# Patient Record
Sex: MALE | Race: WHITE | NOT HISPANIC OR LATINO
[De-identification: names, ages, dates, MRNs, and addresses within clinical notes are randomized per-mention and may not be internally consistent; named-entity substitution may affect disease eponyms.]

---

## 2019-08-28 PROBLEM — Z00.00 ENCOUNTER FOR PREVENTIVE HEALTH EXAMINATION: Status: ACTIVE | Noted: 2019-08-28

## 2019-09-25 ENCOUNTER — APPOINTMENT (OUTPATIENT)
Dept: CARDIOLOGY | Facility: CLINIC | Age: 59
End: 2019-09-25

## 2022-03-17 ENCOUNTER — EMERGENCY (EMERGENCY)
Facility: HOSPITAL | Age: 62
LOS: 1 days | Discharge: HOME | End: 2022-03-17
Attending: EMERGENCY MEDICINE
Payer: COMMERCIAL

## 2022-03-17 VITALS
OXYGEN SATURATION: 99 % | WEIGHT: 197.09 LBS | TEMPERATURE: 98 F | RESPIRATION RATE: 18 BRPM | DIASTOLIC BLOOD PRESSURE: 99 MMHG | HEIGHT: 70 IN | SYSTOLIC BLOOD PRESSURE: 163 MMHG | HEART RATE: 81 BPM

## 2022-03-17 VITALS
HEART RATE: 70 BPM | SYSTOLIC BLOOD PRESSURE: 148 MMHG | RESPIRATION RATE: 15 BRPM | TEMPERATURE: 98 F | DIASTOLIC BLOOD PRESSURE: 94 MMHG | OXYGEN SATURATION: 97 %

## 2022-03-17 LAB
ALBUMIN SERPL ELPH-MCNC: 4.9 G/DL — SIGNIFICANT CHANGE UP (ref 3.3–5)
ALP SERPL-CCNC: 88 U/L — SIGNIFICANT CHANGE UP (ref 40–120)
ALT FLD-CCNC: 26 U/L — SIGNIFICANT CHANGE UP (ref 10–45)
ANION GAP SERPL CALC-SCNC: 15 MMOL/L — SIGNIFICANT CHANGE UP (ref 5–17)
APPEARANCE UR: CLEAR — SIGNIFICANT CHANGE UP
AST SERPL-CCNC: 22 U/L — SIGNIFICANT CHANGE UP (ref 10–40)
BASOPHILS # BLD AUTO: 0.04 K/UL — SIGNIFICANT CHANGE UP (ref 0–0.2)
BASOPHILS NFR BLD AUTO: 0.4 % — SIGNIFICANT CHANGE UP (ref 0–2)
BILIRUB SERPL-MCNC: 0.5 MG/DL — SIGNIFICANT CHANGE UP (ref 0.2–1.2)
BILIRUB UR-MCNC: NEGATIVE — SIGNIFICANT CHANGE UP
BUN SERPL-MCNC: 12 MG/DL — SIGNIFICANT CHANGE UP (ref 7–23)
CALCIUM SERPL-MCNC: 9.3 MG/DL — SIGNIFICANT CHANGE UP (ref 8.4–10.5)
CHLORIDE SERPL-SCNC: 102 MMOL/L — SIGNIFICANT CHANGE UP (ref 96–108)
CO2 SERPL-SCNC: 21 MMOL/L — LOW (ref 22–31)
COLOR SPEC: SIGNIFICANT CHANGE UP
CREAT SERPL-MCNC: 1.07 MG/DL — SIGNIFICANT CHANGE UP (ref 0.5–1.3)
DIFF PNL FLD: NEGATIVE — SIGNIFICANT CHANGE UP
EGFR: 78 ML/MIN/1.73M2 — SIGNIFICANT CHANGE UP
EOSINOPHIL # BLD AUTO: 0.1 K/UL — SIGNIFICANT CHANGE UP (ref 0–0.5)
EOSINOPHIL NFR BLD AUTO: 1 % — SIGNIFICANT CHANGE UP (ref 0–6)
GLUCOSE SERPL-MCNC: 105 MG/DL — HIGH (ref 70–99)
GLUCOSE UR QL: NEGATIVE — SIGNIFICANT CHANGE UP
HCT VFR BLD CALC: 43.3 % — SIGNIFICANT CHANGE UP (ref 39–50)
HGB BLD-MCNC: 15.5 G/DL — SIGNIFICANT CHANGE UP (ref 13–17)
IMM GRANULOCYTES NFR BLD AUTO: 0.3 % — SIGNIFICANT CHANGE UP (ref 0–1.5)
KETONES UR-MCNC: NEGATIVE — SIGNIFICANT CHANGE UP
LEUKOCYTE ESTERASE UR-ACNC: NEGATIVE — SIGNIFICANT CHANGE UP
LIDOCAIN IGE QN: 16 U/L — SIGNIFICANT CHANGE UP (ref 7–60)
LYMPHOCYTES # BLD AUTO: 2.31 K/UL — SIGNIFICANT CHANGE UP (ref 1–3.3)
LYMPHOCYTES # BLD AUTO: 23.3 % — SIGNIFICANT CHANGE UP (ref 13–44)
MCHC RBC-ENTMCNC: 31 PG — SIGNIFICANT CHANGE UP (ref 27–34)
MCHC RBC-ENTMCNC: 35.8 GM/DL — SIGNIFICANT CHANGE UP (ref 32–36)
MCV RBC AUTO: 86.6 FL — SIGNIFICANT CHANGE UP (ref 80–100)
MONOCYTES # BLD AUTO: 0.47 K/UL — SIGNIFICANT CHANGE UP (ref 0–0.9)
MONOCYTES NFR BLD AUTO: 4.7 % — SIGNIFICANT CHANGE UP (ref 2–14)
NEUTROPHILS # BLD AUTO: 6.96 K/UL — SIGNIFICANT CHANGE UP (ref 1.8–7.4)
NEUTROPHILS NFR BLD AUTO: 70.3 % — SIGNIFICANT CHANGE UP (ref 43–77)
NITRITE UR-MCNC: NEGATIVE — SIGNIFICANT CHANGE UP
NRBC # BLD: 0 /100 WBCS — SIGNIFICANT CHANGE UP (ref 0–0)
PH UR: 6 — SIGNIFICANT CHANGE UP (ref 5–8)
PLATELET # BLD AUTO: 313 K/UL — SIGNIFICANT CHANGE UP (ref 150–400)
POTASSIUM SERPL-MCNC: 3.8 MMOL/L — SIGNIFICANT CHANGE UP (ref 3.5–5.3)
POTASSIUM SERPL-SCNC: 3.8 MMOL/L — SIGNIFICANT CHANGE UP (ref 3.5–5.3)
PROT SERPL-MCNC: 7.5 G/DL — SIGNIFICANT CHANGE UP (ref 6–8.3)
PROT UR-MCNC: NEGATIVE — SIGNIFICANT CHANGE UP
RBC # BLD: 5 M/UL — SIGNIFICANT CHANGE UP (ref 4.2–5.8)
RBC # FLD: 12 % — SIGNIFICANT CHANGE UP (ref 10.3–14.5)
SODIUM SERPL-SCNC: 138 MMOL/L — SIGNIFICANT CHANGE UP (ref 135–145)
SP GR SPEC: 1.01 — SIGNIFICANT CHANGE UP (ref 1.01–1.02)
UROBILINOGEN FLD QL: NEGATIVE — SIGNIFICANT CHANGE UP
WBC # BLD: 9.91 K/UL — SIGNIFICANT CHANGE UP (ref 3.8–10.5)
WBC # FLD AUTO: 9.91 K/UL — SIGNIFICANT CHANGE UP (ref 3.8–10.5)

## 2022-03-17 PROCEDURE — 85025 COMPLETE CBC W/AUTO DIFF WBC: CPT

## 2022-03-17 PROCEDURE — 74177 CT ABD & PELVIS W/CONTRAST: CPT | Mod: 26,MA

## 2022-03-17 PROCEDURE — 99285 EMERGENCY DEPT VISIT HI MDM: CPT

## 2022-03-17 PROCEDURE — 83690 ASSAY OF LIPASE: CPT

## 2022-03-17 PROCEDURE — 80053 COMPREHEN METABOLIC PANEL: CPT

## 2022-03-17 PROCEDURE — 36415 COLL VENOUS BLD VENIPUNCTURE: CPT

## 2022-03-17 PROCEDURE — 81003 URINALYSIS AUTO W/O SCOPE: CPT

## 2022-03-17 PROCEDURE — 99284 EMERGENCY DEPT VISIT MOD MDM: CPT | Mod: 25

## 2022-03-17 PROCEDURE — 87086 URINE CULTURE/COLONY COUNT: CPT

## 2022-03-17 PROCEDURE — 74177 CT ABD & PELVIS W/CONTRAST: CPT | Mod: MA

## 2022-03-17 NOTE — ED PROVIDER NOTE - OBJECTIVE STATEMENT
61yr M hx of htn, pre diabetes, IBS, anxiety (recently taken off meds) p/w intermittent abd pain rt sided and nausea, no vomiting, and change in stool color. reports dark and light color stools, no melena. no urinary sx, no weight loss. denies fever chills, no cp, sob. is physically active. denies dizziness.   used to take etoh heavily, now only occasionally  non smoker 61yr M hx of htn, pre diabetes, IBS, anxiety (recently taken off meds) p/w intermittent abd pain rt sided and nausea, no vomiting, and change in stool color. reports dark and light color stools, no melena. no urinary sx, no weight loss. denies fever chills, no cp, sob. is physically active. denies dizziness.   used to take etoh heavily, now only occasionally  non smoker    [place], PGY1: 50yo male pt PMH lactose intolerant, gluten intolerant, IBS, anxiety who presents to ED for abdominal pain since 1 week ago worsening since. Patient refers n/v/d, fevers, chills, urinary symptoms. Recently stopped anxiety medication. Follows up with GI outpatient with recent colonoscopy that was found within normal limits except for polyps. Changes to stool color paler yellow recently. Denies abdominal surgeries or any other symptoms at this time.

## 2022-03-17 NOTE — ED PROVIDER NOTE - PHYSICAL EXAMINATION
well appearing, well developed, comfortable no distress  neuro intact  clear lungs  S1-2  no organomegaly, rt sided ttp no rebound  , no hernia  no peripheral edema well appearing, well developed, comfortable no distress  neuro intact  clear lungs  S1-2  no organomegaly, rt sided ttp no rebound  , no hernia  no peripheral edema    [place], PGY1:   GENERAL: Awake, alert, anxious on exam  HEENT: NC/AT, moist mucous membranes   LUNGS: CTAB, no wheezes or crackles   CARDIAC: RRR, no m/r/g  ABDOMEN: Soft, tenderness over RUQ, umbilical and LLQ tp. no rebound or guarding  BACK: no CVA tenderness  EXT: No edema, no calf tenderness, 2+ PT pulses BL  NEURO: A&Ox3. Moving all extremities.  SKIN: Warm and dry. No rash.
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
## 2022-03-17 NOTE — ED PROVIDER NOTE - CLINICAL SUMMARY MEDICAL DECISION MAKING FREE TEXT BOX
61yr M hx of htn, prediabetes IBS anxiety no on any meds (is averse to meds) p/w several days of abd pain. exam as above. concern for possible diverticulutus vs appy, will do CT ap, labs, if work up unremarkable. will need to follow up w GI. happy with plan.

## 2022-03-17 NOTE — ED PROVIDER NOTE - NSICDXPASTMEDICALHX_GEN_ALL_CORE_FT
PAST MEDICAL HISTORY:  Anxiety     Gluten intolerance     IBS (irritable bowel syndrome)     Lactose intolerance

## 2022-03-17 NOTE — ED ADULT NURSE NOTE - OBJECTIVE STATEMENT
62 yo male from home A&OX4 ambulatory on arrival c/o lower quadrant abd pain, more severe in the right, radiating to right flank x 10days. Patient states is exacerbated by eating, denies n/v/d. Patient denies blood in stools, endorses some constipation and irregular BM. Patient denies urinary symptoms, denies fevers/chills. Patient denies chest pain, SOB, palpitations. Abd soft, nondistended, tender RLQ on palpation, states has improved from previous days. VS stable.

## 2022-03-17 NOTE — ED PROVIDER NOTE - NSFOLLOWUPINSTRUCTIONS_ED_ALL_ED_FT
1. Please find your results attached.     2. Follow up with your primary care doctor and gastroenterology for further work up.     3. May take Tylenol or motrin for pain.    4. Return to ED if worsening pain or any other concerning symptoms.       Abdominal Pain, Adult  Abdominal pain can be caused by many things. Often, abdominal pain is not serious and it gets better with no treatment or by being treated at home. However, sometimes abdominal pain is serious. Your health care provider will do a medical history and a physical exam to try to determine the cause of your abdominal pain.    Follow these instructions at home:  Take over-the-counter and prescription medicines only as told by your health care provider. Do not take a laxative unless told by your health care provider.  Drink enough fluid to keep your urine clear or pale yellow.  Watch your condition for any changes.  Keep all follow-up visits as told by your health care provider. This is important.  Contact a health care provider if:  Your abdominal pain changes or gets worse.  You are not hungry or you lose weight without trying.  You are constipated or have diarrhea for more than 2–3 days.  You have pain when you urinate or have a bowel movement.  Your abdominal pain wakes you up at night.  Your pain gets worse with meals, after eating, or with certain foods.  You are throwing up and cannot keep anything down.  You have a fever.  Get help right away if:  Your pain does not go away as soon as your health care provider told you to expect.  You cannot stop throwing up.  Your pain is only in areas of the abdomen, such as the right side or the left lower portion of the abdomen.  You have bloody or black stools, or stools that look like tar.  You have severe pain, cramping, or bloating in your abdomen.  You have signs of dehydration, such as:    Dark urine, very little urine, or no urine.  Cracked lips.  Dry mouth.  Sunken eyes.  Sleepiness.  Weakness.

## 2022-03-17 NOTE — ED PROVIDER NOTE - NS ED ROS FT
CONST: no fevers, no chills  EYES: no pain, no vision changes  ENT: no sore throat, no ear pain, no change in hearing  CV: no chest pain, no leg swelling  RESP: no shortness of breath, no cough  ABD: no nausea, no vomiting, no diarrhea. +abdominal pain  : no dysuria, no flank pain, no hematuria  MSK: no back pain, no extremity pain  NEURO: no headache or additional neurologic complaints  SKIN:  no rash

## 2022-03-17 NOTE — ED PROVIDER NOTE - PATIENT PORTAL LINK FT
You can access the FollowMyHealth Patient Portal offered by [facility] by registering at the following website: [email or web address]. By joining [facility]’s FollowMyHealth portal, you will also be able to view your health information using other applications (apps) compatible with our system.

## 2022-03-17 NOTE — ED PROVIDER NOTE - NS ED ATTENDING STATEMENT MOD
This was a shared visit with the LUKE. I reviewed and verified the documentation and independently performed the documented:

## 2022-03-18 ENCOUNTER — APPOINTMENT (OUTPATIENT)
Dept: CARDIOLOGY | Facility: CLINIC | Age: 62
End: 2022-03-18
Payer: COMMERCIAL

## 2022-03-18 ENCOUNTER — NON-APPOINTMENT (OUTPATIENT)
Age: 62
End: 2022-03-18

## 2022-03-18 VITALS
BODY MASS INDEX: 28 KG/M2 | DIASTOLIC BLOOD PRESSURE: 110 MMHG | SYSTOLIC BLOOD PRESSURE: 186 MMHG | WEIGHT: 200 LBS | HEART RATE: 88 BPM | TEMPERATURE: 98.3 F | HEIGHT: 71 IN | OXYGEN SATURATION: 98 %

## 2022-03-18 DIAGNOSIS — Z13.6 ENCOUNTER FOR SCREENING FOR CARDIOVASCULAR DISORDERS: ICD-10-CM

## 2022-03-18 DIAGNOSIS — Z82.3 FAMILY HISTORY OF STROKE: ICD-10-CM

## 2022-03-18 DIAGNOSIS — Z78.9 OTHER SPECIFIED HEALTH STATUS: ICD-10-CM

## 2022-03-18 DIAGNOSIS — Z86.59 PERSONAL HISTORY OF OTHER MENTAL AND BEHAVIORAL DISORDERS: ICD-10-CM

## 2022-03-18 DIAGNOSIS — Z82.49 FAMILY HISTORY OF ISCHEMIC HEART DISEASE AND OTHER DISEASES OF THE CIRCULATORY SYSTEM: ICD-10-CM

## 2022-03-18 DIAGNOSIS — Z81.8 FAMILY HISTORY OF OTHER MENTAL AND BEHAVIORAL DISORDERS: ICD-10-CM

## 2022-03-18 DIAGNOSIS — Z80.1 FAMILY HISTORY OF MALIGNANT NEOPLASM OF TRACHEA, BRONCHUS AND LUNG: ICD-10-CM

## 2022-03-18 DIAGNOSIS — R03.0 ELEVATED BLOOD-PRESSURE READING, W/OUT DIAGNOSIS OF HYPERTENSION: ICD-10-CM

## 2022-03-18 DIAGNOSIS — R63.4 ABNORMAL WEIGHT LOSS: ICD-10-CM

## 2022-03-18 LAB
CULTURE RESULTS: SIGNIFICANT CHANGE UP
SPECIMEN SOURCE: SIGNIFICANT CHANGE UP

## 2022-03-18 PROCEDURE — 93000 ELECTROCARDIOGRAM COMPLETE: CPT

## 2022-03-18 PROCEDURE — 93306 TTE W/DOPPLER COMPLETE: CPT

## 2022-03-18 PROCEDURE — 99386 PREV VISIT NEW AGE 40-64: CPT

## 2022-03-18 NOTE — REVIEW OF SYSTEMS
[Abdominal Pain] : abdominal pain [Diarrhea] : diarrhea [Constipation] : constipation [Negative] : Heme/Lymph [SOB] : shortness of breath

## 2022-03-18 NOTE — HISTORY OF PRESENT ILLNESS
[FreeTextEntry1] : This is a 61 year old male who presents today to establish care in our office. He reports that he has been experiencing  diffuse abdominal pain radiating to lower back for approx 2 weeks associated with abdominal bloating, diarrhea alternating with constipation. He went to urgent care on monday and went to [facility] ER yesterday. He had CT abdomen and pelvis which was negative for acute pathology. Findings were consistent with small hiatal hernia and hepatic steatosis. He reports he still is experiencing mild discomfort and loss of appetite.  He reports that over the last two weeks his BP has been running in the 180's/100's sytolic. He reports he has been checking his BP at home and usually is around 140's/70. He denies chest pain, sob, dizziness,palpitations. He has hx of MI in his father at age 66. He reports he is increased in stress/anxiety at home. He sees psychiatry and has been weaning himself off mirtazapine. pt with ocassional dyspnea

## 2022-03-21 ENCOUNTER — NON-APPOINTMENT (OUTPATIENT)
Age: 62
End: 2022-03-21

## 2022-03-21 ENCOUNTER — APPOINTMENT (OUTPATIENT)
Dept: GASTROENTEROLOGY | Facility: CLINIC | Age: 62
End: 2022-03-21
Payer: COMMERCIAL

## 2022-03-21 VITALS
WEIGHT: 200 LBS | DIASTOLIC BLOOD PRESSURE: 110 MMHG | HEIGHT: 70 IN | SYSTOLIC BLOOD PRESSURE: 160 MMHG | BODY MASS INDEX: 28.63 KG/M2

## 2022-03-21 DIAGNOSIS — R19.8 OTHER SPECIFIED SYMPTOMS AND SIGNS INVOLVING THE DIGESTIVE SYSTEM AND ABDOMEN: ICD-10-CM

## 2022-03-21 DIAGNOSIS — Z63.5 DISRUPTION OF FAMILY BY SEPARATION AND DIVORCE: ICD-10-CM

## 2022-03-21 DIAGNOSIS — Z87.19 PERSONAL HISTORY OF OTHER DISEASES OF THE DIGESTIVE SYSTEM: ICD-10-CM

## 2022-03-21 DIAGNOSIS — K76.0 FATTY (CHANGE OF) LIVER, NOT ELSEWHERE CLASSIFIED: ICD-10-CM

## 2022-03-21 PROCEDURE — 99204 OFFICE O/P NEW MOD 45 MIN: CPT

## 2022-03-21 SDOH — SOCIAL STABILITY - SOCIAL INSECURITY: DISRUPTION OF FAMILY BY SEPARATION AND DIVORCE: Z63.5

## 2022-03-21 NOTE — HISTORY OF PRESENT ILLNESS
[FreeTextEntry1] : He is a 61-year-old male who has been on anti anxiety medication for years, but was taken off of it one month ago.  Two weeks ago he had increased stress in his life and developed intermittent crampy diffuse abdominal pain associated with irregular bowel habits.  He denies rectal bleeding, fever, chills or weight loss.  He was seen in the ER in which a CAT scan revealed diverticular disease and a fatty liver,  but no diverticulitis.  He had a  colonoscopy 2 years ago in  which a few polyps were removed and diverticular disease was seen.  He also had an endoscopy 2 years ago which  was normal.  He went back on his antianxiety medications  4 days ago, and is feeling better, but is an asymptomatic

## 2022-03-21 NOTE — REVIEW OF SYSTEMS
[Abdominal Pain] : abdominal pain [Constipation] : constipation [Diarrhea] : diarrhea [Negative] : Heme/Lymph

## 2022-03-21 NOTE — PHYSICAL EXAM
[General Appearance - Alert] : alert [General Appearance - In No Acute Distress] : in no acute distress [Sclera] : the sclera and conjunctiva were normal [PERRL With Normal Accommodation] : pupils were equal in size, round, and reactive to light [Outer Ear] : the ears and nose were normal in appearance [Extraocular Movements] : extraocular movements were intact [Oropharynx] : the oropharynx was normal [Neck Appearance] : the appearance of the neck was normal [Neck Cervical Mass (___cm)] : no neck mass was observed [Jugular Venous Distention Increased] : there was no jugular-venous distention [Thyroid Diffuse Enlargement] : the thyroid was not enlarged [Thyroid Nodule] : there were no palpable thyroid nodules [Heart Rate And Rhythm] : heart rate was normal and rhythm regular [Heart Sounds] : normal S1 and S2 [Heart Sounds Gallop] : no gallops [Murmurs] : no murmurs [Heart Sounds Pericardial Friction Rub] : no pericardial rub [Bowel Sounds] : normal bowel sounds [Abdomen Soft] : soft [Abdomen Tenderness] : non-tender [] : no hepato-splenomegaly [Abdomen Mass (___ Cm)] : no abdominal mass palpated

## 2022-03-21 NOTE — CONSULT LETTER
[Dear  ___] : Dear  [unfilled], [Consult Letter:] : I had the pleasure of evaluating your patient, [unfilled]. [( Thank you for referring [unfilled] for consultation for _____ )] : Thank you for referring [unfilled] for consultation for [unfilled] [Please see my note below.] : Please see my note below. [Consult Closing:] : Thank you very much for allowing me to participate in the care of this patient.  If you have any questions, please do not hesitate to contact me. [Sincerely,] : Sincerely, [FreeTextEntry3] : Don\par \par Enrike Ken MD\par \par Gastroenterology\par [facility]\par [facility]\par \par

## 2022-03-21 NOTE — ASSESSMENT
[FreeTextEntry1] : He has an exacerbation of his irritable bowel syndrome,  which is improving somewhat since he is back on his medications.\par I also recommended that he start hyoscyamine 0.375 mg twice a day\par \par Follow-up in the office in 2 to 3 weeks\par \par I referred him back to you for blood pressure control and has an appointment to see you tomorrow

## 2022-03-22 ENCOUNTER — TRANSCRIPTION ENCOUNTER (OUTPATIENT)
Age: 62
End: 2022-03-22

## 2022-03-22 ENCOUNTER — APPOINTMENT (OUTPATIENT)
Dept: CARDIOLOGY | Facility: CLINIC | Age: 62
End: 2022-03-22
Payer: COMMERCIAL

## 2022-03-22 DIAGNOSIS — I47.1 SUPRAVENTRICULAR TACHYCARDIA: ICD-10-CM

## 2022-03-22 PROCEDURE — 78452 HT MUSCLE IMAGE SPECT MULT: CPT

## 2022-03-22 PROCEDURE — 93015 CV STRESS TEST SUPVJ I&R: CPT

## 2022-03-22 PROCEDURE — A9500: CPT

## 2022-03-23 ENCOUNTER — NON-APPOINTMENT (OUTPATIENT)
Age: 62
End: 2022-03-23

## 2022-03-24 LAB
25(OH)D3 SERPL-MCNC: 44.3 NG/ML
ALBUMIN SERPL ELPH-MCNC: 5.2 G/DL
ALDOSTERONE SERUM: 8.9 NG/DL
ALP BLD-CCNC: 97 U/L
ALT SERPL-CCNC: 29 U/L
AMYLASE/CREAT SERPL: 36 U/L
ANION GAP SERPL CALC-SCNC: 18 MMOL/L
AST SERPL-CCNC: 19 U/L
BASOPHILS # BLD AUTO: 0.02 K/UL
BASOPHILS NFR BLD AUTO: 0.2 %
BILIRUB DIRECT SERPL-MCNC: 0.1 MG/DL
BILIRUB INDIRECT SERPL-MCNC: 0.3 MG/DL
BILIRUB SERPL-MCNC: 0.4 MG/DL
BUN SERPL-MCNC: 15 MG/DL
CALCIUM SERPL-MCNC: 9.9 MG/DL
CHLORIDE SERPL-SCNC: 101 MMOL/L
CHOLEST SERPL-MCNC: 221 MG/DL
CO2 SERPL-SCNC: 20 MMOL/L
CREAT SERPL-MCNC: 1.19 MG/DL
EGFR: 69 ML/MIN/1.73M2
EOSINOPHIL # BLD AUTO: 0.07 K/UL
EOSINOPHIL NFR BLD AUTO: 0.7 %
ESTIMATED AVERAGE GLUCOSE: 114 MG/DL
GLUCOSE SERPL-MCNC: 97 MG/DL
HBA1C MFR BLD HPLC: 5.6 %
HCT VFR BLD CALC: 48.5 %
HDLC SERPL-MCNC: 72 MG/DL
HGB BLD-MCNC: 16.7 G/DL
IMM GRANULOCYTES NFR BLD AUTO: 0.3 %
LDLC SERPL CALC-MCNC: 127 MG/DL
LPL SERPL-CCNC: 19 U/L
LYMPHOCYTES # BLD AUTO: 1.95 K/UL
LYMPHOCYTES NFR BLD AUTO: 19.2 %
MAGNESIUM SERPL-MCNC: 2.2 MG/DL
MAN DIFF?: NORMAL
MCHC RBC-ENTMCNC: 31.4 PG
MCHC RBC-ENTMCNC: 34.4 GM/DL
MCV RBC AUTO: 91.2 FL
MONOCYTES # BLD AUTO: 0.48 K/UL
MONOCYTES NFR BLD AUTO: 4.7 %
NEUTROPHILS # BLD AUTO: 7.61 K/UL
NEUTROPHILS NFR BLD AUTO: 74.9 %
NONHDLC SERPL-MCNC: 149 MG/DL
PHOSPHATE SERPL-MCNC: 4.1 MG/DL
PLATELET # BLD AUTO: 327 K/UL
POTASSIUM SERPL-SCNC: 4.2 MMOL/L
PROT SERPL-MCNC: 7.5 G/DL
PSA SERPL-MCNC: 0.62 NG/ML
RBC # BLD: 5.32 M/UL
RBC # FLD: 12.6 %
SODIUM SERPL-SCNC: 140 MMOL/L
T3FREE SERPL-MCNC: 3 PG/ML
T4 FREE SERPL-MCNC: 1.3 NG/DL
TRIGL SERPL-MCNC: 110 MG/DL
TSH SERPL-ACNC: 1.06 UIU/ML
URATE SERPL-MCNC: 6.7 MG/DL
WBC # FLD AUTO: 10.16 K/UL

## 2022-03-25 ENCOUNTER — APPOINTMENT (OUTPATIENT)
Dept: CT IMAGING | Facility: CLINIC | Age: 62
End: 2022-03-25

## 2022-03-25 ENCOUNTER — OUTPATIENT (OUTPATIENT)
Dept: OUTPATIENT SERVICES | Facility: HOSPITAL | Age: 62
LOS: 1 days | End: 2022-03-25

## 2022-03-25 ENCOUNTER — RESULT REVIEW (OUTPATIENT)
Age: 62
End: 2022-03-25

## 2022-03-25 ENCOUNTER — APPOINTMENT (OUTPATIENT)
Dept: CT IMAGING | Facility: CLINIC | Age: 62
End: 2022-03-25
Payer: COMMERCIAL

## 2022-03-25 ENCOUNTER — APPOINTMENT (OUTPATIENT)
Dept: ULTRASOUND IMAGING | Facility: CLINIC | Age: 62
End: 2022-03-25
Payer: COMMERCIAL

## 2022-03-25 PROCEDURE — 71250 CT THORAX DX C-: CPT | Mod: 26

## 2022-03-25 PROCEDURE — 75571 CT HRT W/O DYE W/CA TEST: CPT | Mod: 26,59

## 2022-03-25 PROCEDURE — 93976 VASCULAR STUDY: CPT | Mod: 26

## 2022-03-25 PROCEDURE — 76770 US EXAM ABDO BACK WALL COMP: CPT | Mod: 26,59

## 2022-03-31 ENCOUNTER — APPOINTMENT (OUTPATIENT)
Dept: CARDIOLOGY | Facility: CLINIC | Age: 62
End: 2022-03-31
Payer: COMMERCIAL

## 2022-03-31 VITALS
DIASTOLIC BLOOD PRESSURE: 90 MMHG | HEIGHT: 70 IN | HEART RATE: 70 BPM | WEIGHT: 200 LBS | SYSTOLIC BLOOD PRESSURE: 170 MMHG | BODY MASS INDEX: 28.63 KG/M2 | OXYGEN SATURATION: 99 % | TEMPERATURE: 98.6 F

## 2022-03-31 DIAGNOSIS — R91.8 OTHER NONSPECIFIC ABNORMAL FINDING OF LUNG FIELD: ICD-10-CM

## 2022-03-31 PROCEDURE — 99214 OFFICE O/P EST MOD 30 MIN: CPT

## 2022-03-31 RX ORDER — ASPIRIN ENTERIC COATED TABLETS 81 MG 81 MG/1
81 TABLET, DELAYED RELEASE ORAL DAILY
Qty: 30 | Refills: 3 | Status: ACTIVE | COMMUNITY
Start: 2022-03-31 | End: 1900-01-01

## 2022-03-31 NOTE — HISTORY OF PRESENT ILLNESS
[FreeTextEntry1] : This is a 62 year old male who presents today for follow up visit. He reports he did follow with GI and ws started on hyoscyamine 0.375 BID and patient reports his abdominal pain has improved. Pt did start BP medications as advised, pt BP elevated in office today, pt feels this is due to "white coat syndrome". He is s/p ECHO and normal nuclear stress test as well as normal US kidneys.  Pt requesting referral  to allergist. Denies chest pain, dyspnea, dizziness, palpations, changes in appetite/bowel habits, nausea, vomiting, abdominal pain.\par

## 2022-04-07 ENCOUNTER — APPOINTMENT (OUTPATIENT)
Dept: CARDIOLOGY | Facility: CLINIC | Age: 62
End: 2022-04-07

## 2022-04-07 ENCOUNTER — APPOINTMENT (OUTPATIENT)
Dept: GASTROENTEROLOGY | Facility: CLINIC | Age: 62
End: 2022-04-07
Payer: COMMERCIAL

## 2022-04-07 ENCOUNTER — RESULT REVIEW (OUTPATIENT)
Age: 62
End: 2022-04-07

## 2022-04-07 ENCOUNTER — OUTPATIENT (OUTPATIENT)
Dept: OUTPATIENT SERVICES | Facility: HOSPITAL | Age: 62
LOS: 1 days | End: 2022-04-07
Payer: COMMERCIAL

## 2022-04-07 VITALS
SYSTOLIC BLOOD PRESSURE: 140 MMHG | BODY MASS INDEX: 29.06 KG/M2 | DIASTOLIC BLOOD PRESSURE: 90 MMHG | HEIGHT: 70 IN | WEIGHT: 203 LBS

## 2022-04-07 DIAGNOSIS — I25.10 ATHEROSCLEROTIC HEART DISEASE OF NATIVE CORONARY ARTERY WITHOUT ANGINA PECTORIS: ICD-10-CM

## 2022-04-07 DIAGNOSIS — R06.02 SHORTNESS OF BREATH: ICD-10-CM

## 2022-04-07 PROCEDURE — 75574 CT ANGIO HRT W/3D IMAGE: CPT

## 2022-04-07 PROCEDURE — 99214 OFFICE O/P EST MOD 30 MIN: CPT

## 2022-04-07 PROCEDURE — 75574 CT ANGIO HRT W/3D IMAGE: CPT | Mod: 26

## 2022-04-07 NOTE — CONSULT LETTER
[Dear  ___] : Dear  [unfilled], [Consult Letter:] : I had the pleasure of evaluating your patient, [unfilled]. [( Thank you for referring [unfilled] for consultation for _____ )] : Thank you for referring [unfilled] for consultation for [unfilled] [Please see my note below.] : Please see my note below. [Consult Closing:] : Thank you very much for allowing me to participate in the care of this patient.  If you have any questions, please do not hesitate to contact me. [Sincerely,] : Sincerely, [FreeTextEntry3] : Don\par \par Enrike Ken MD\par \par Gastroenterology\par [facility]\par [facility]\par \par

## 2022-04-07 NOTE — HISTORY OF PRESENT ILLNESS
[FreeTextEntry1] : He is a 62 year old male with crampy abdominal pain and irregular bowel habits. He was started on  hyoscyamine 0.375 mg twice  a day with complete resolution of his symptoms.  He denies abdominal pain and is presently having good bowel movements.  He is  undergoing cardiac evaluation as he has an abnormal calcium score on his cardiac CT scan.

## 2022-04-07 NOTE — PHYSICAL EXAM
[Sclera] : the sclera and conjunctiva were normal [PERRL With Normal Accommodation] : pupils were equal in size, round, and reactive to light [Extraocular Movements] : extraocular movements were intact [Outer Ear] : the ears and nose were normal in appearance [Oropharynx] : the oropharynx was normal [Neck Appearance] : the appearance of the neck was normal [Neck Cervical Mass (___cm)] : no neck mass was observed [Jugular Venous Distention Increased] : there was no jugular-venous distention [Thyroid Diffuse Enlargement] : the thyroid was not enlarged [Thyroid Nodule] : there were no palpable thyroid nodules [Bowel Sounds] : normal bowel sounds [Abdomen Soft] : soft [Abdomen Tenderness] : non-tender [] : no hepato-splenomegaly [Abdomen Mass (___ Cm)] : no abdominal mass palpated

## 2022-04-07 NOTE — ASSESSMENT
[FreeTextEntry1] : Continue hyoscyamine\par \par Office follow-up in 3 to 4 months and, if he is feeling well at that time, will start to  taper his medication

## 2022-04-11 PROCEDURE — 93224 XTRNL ECG REC UP TO 48 HRS: CPT

## 2022-04-12 ENCOUNTER — NON-APPOINTMENT (OUTPATIENT)
Age: 62
End: 2022-04-12

## 2022-04-13 ENCOUNTER — APPOINTMENT (OUTPATIENT)
Dept: ALLERGY | Facility: CLINIC | Age: 62
End: 2022-04-13
Payer: COMMERCIAL

## 2022-04-13 VITALS
DIASTOLIC BLOOD PRESSURE: 87 MMHG | OXYGEN SATURATION: 97 % | HEART RATE: 83 BPM | TEMPERATURE: 99.2 F | SYSTOLIC BLOOD PRESSURE: 162 MMHG

## 2022-04-13 LAB
ANION GAP SERPL CALC-SCNC: 16 MMOL/L
BUN SERPL-MCNC: 15 MG/DL
CALCIUM SERPL-MCNC: 9.7 MG/DL
CHLORIDE SERPL-SCNC: 103 MMOL/L
CO2 SERPL-SCNC: 21 MMOL/L
CREAT SERPL-MCNC: 1.06 MG/DL
EGFR: 78 ML/MIN/1.73M2
GLUCOSE SERPL-MCNC: 96 MG/DL
POTASSIUM SERPL-SCNC: 4.5 MMOL/L
SODIUM SERPL-SCNC: 140 MMOL/L

## 2022-04-13 PROCEDURE — 99204 OFFICE O/P NEW MOD 45 MIN: CPT | Mod: 25

## 2022-04-13 PROCEDURE — 95018 ALL TSTG PERQ&IQ DRUGS/BIOL: CPT

## 2022-04-13 PROCEDURE — 95004 PERQ TESTS W/ALRGNC XTRCS: CPT

## 2022-04-13 NOTE — SOCIAL HISTORY
[Apartment] : [unfilled] lives in an apartment  [] :  [de-identified] : son  [FreeTextEntry2] : Retired   [Smokers in Household] : there are no smokers in the home

## 2022-04-13 NOTE — HISTORY OF PRESENT ILLNESS
[Asthma] : asthma [Allergic Rhinitis] : allergic rhinitis [Eczematous rashes] : eczematous rashes [Venom Reactions] : venom reactions [Food Allergies] : food allergies [de-identified] : Patient reports allergic reaction to the following drugs:\par \par Penicillin - age 2 - he has no history to recall \par Sulfa - age 2 - he has no history to recall \par \par One month ago - under a lot of stress - drinking a lot of tea - history of IBD - elevated blood pressure - irritable stomach - itchy - tingling of his lips - he was taken off of mirtazipine - PCP restarted medication and he spoke with psychiatrist and was advised to restart medication. His cardiac evaluation was normal.   He expressed concern about chamomile tea resulting in his symptoms.

## 2022-04-13 NOTE — ASSESSMENT
[FreeTextEntry1] : Remote history of penicillin allergy - he will RV for antibiotic skin testing \par \par Anxiety disorder with no evidence of underlying food allergies

## 2022-04-13 NOTE — PHYSICAL EXAM
[Alert] : alert [Well Nourished] : well nourished [Healthy Appearance] : healthy appearance [No Acute Distress] : no acute distress [Well Developed] : well developed [Normal Voice/Communication] : normal voice communication [No Neck Mass] : no neck mass was observed [No LAD] : no lymphadenopathy [No Thyroid Mass] : no thyroid mass [Supple] : the neck was supple [Normal Rate and Effort] : normal respiratory rhythm and effort [No Crackles] : no crackles [No Retractions] : no retractions [Wheezing] : no wheezing was heard [Normal Rate] : heart rate was normal  [Normal S1, S2] : normal S1 and S2 [No murmur] : no murmur [Regular Rhythm] : with a regular rhythm [Normal Cervical Lymph Nodes] : cervical [Skin Intact] : skin intact  [No Rash] : no rash [No clubbing] : no clubbing [No Cyanosis] : no cyanosis [de-identified] : anxious male

## 2022-04-27 ENCOUNTER — APPOINTMENT (OUTPATIENT)
Dept: CARDIOLOGY | Facility: CLINIC | Age: 62
End: 2022-04-27
Payer: COMMERCIAL

## 2022-04-27 VITALS
DIASTOLIC BLOOD PRESSURE: 89 MMHG | SYSTOLIC BLOOD PRESSURE: 143 MMHG | OXYGEN SATURATION: 98 % | HEART RATE: 67 BPM | BODY MASS INDEX: 29.2 KG/M2 | TEMPERATURE: 98.3 F | HEIGHT: 70 IN | WEIGHT: 204 LBS

## 2022-04-27 DIAGNOSIS — T78.40XA ALLERGY, UNSPECIFIED, INITIAL ENCOUNTER: ICD-10-CM

## 2022-04-27 DIAGNOSIS — R29.898 OTHER SYMPTOMS AND SIGNS INVOLVING THE MUSCULOSKELETAL SYSTEM: ICD-10-CM

## 2022-04-27 PROCEDURE — 99213 OFFICE O/P EST LOW 20 MIN: CPT

## 2022-04-27 RX ORDER — MIRTAZAPINE 7.5 MG/1
7.5 TABLET, FILM COATED ORAL
Qty: 30 | Refills: 3 | Status: ACTIVE | COMMUNITY

## 2022-04-27 NOTE — HISTORY OF PRESENT ILLNESS
[FreeTextEntry1] : This is a 62 year old male with a Pmhx of HTN HLD CAD, IBS who presents to the office for follow up\par \par seen 3/13/22 and was started on ASA and Atorvastatin 40\par pt reports tolerating medication well denies any complaints\par Denies any dizziness CP or SOB denies any myalgias \par \par pt reports he finds his BP increased during stressful situations - last week had a wedding and was stressed SBP 140s-150s \par Denies any headaches or blurry vision \par \par

## 2022-05-02 ENCOUNTER — APPOINTMENT (OUTPATIENT)
Dept: ALLERGY | Facility: CLINIC | Age: 62
End: 2022-05-02
Payer: COMMERCIAL

## 2022-05-02 PROCEDURE — 95004 PERQ TESTS W/ALRGNC XTRCS: CPT

## 2022-05-02 PROCEDURE — 95024 IQ TESTS W/ALLERGENIC XTRCS: CPT

## 2022-05-02 PROCEDURE — 95018 ALL TSTG PERQ&IQ DRUGS/BIOL: CPT

## 2022-05-02 NOTE — IMPRESSION
[FreeTextEntry1] : Skin testing to Penicillin and related antibiotics negative - cat/dog skin testing negative

## 2022-05-02 NOTE — ASSESSMENT
[FreeTextEntry1] : Antibiotic skin testing to Penicillin and related antibiotics were negative.   Cat/dog skin testing negative.

## 2022-05-04 ENCOUNTER — APPOINTMENT (OUTPATIENT)
Dept: ORTHOPEDIC SURGERY | Facility: CLINIC | Age: 62
End: 2022-05-04
Payer: COMMERCIAL

## 2022-05-04 ENCOUNTER — NON-APPOINTMENT (OUTPATIENT)
Age: 62
End: 2022-05-04

## 2022-05-04 VITALS
BODY MASS INDEX: 28.31 KG/M2 | SYSTOLIC BLOOD PRESSURE: 182 MMHG | HEIGHT: 70.5 IN | WEIGHT: 200 LBS | DIASTOLIC BLOOD PRESSURE: 73 MMHG | HEART RATE: 85 BPM

## 2022-05-04 PROCEDURE — 99204 OFFICE O/P NEW MOD 45 MIN: CPT | Mod: 25

## 2022-05-04 PROCEDURE — 73140 X-RAY EXAM OF FINGER(S): CPT | Mod: F5

## 2022-05-04 NOTE — DISCUSSION/SUMMARY
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 At this time, we discussed treatment options consisting of an right thumb IP joint fusion. I did tell him that even with the fusion, he will still have further shortening to the right thumb. He would like to proceed with fusion of the right thumb IP joint and will speak with our surgical scheduler. \par \par -  The nature and purposes of the operation/procedure was discussed in detail.  I discussed the surgical procedure in detail, as well as expected postoperative recovery and outcome.\par -  Possible risks, benefits, and complications (from known and unknown causes) of the procedure were discussed in detail.  \par -  Possible non-operative alternatives to the proposed treatment were discussed in detail.  \par -  He was told that possible risks/complications include, but are not limited to:  Infection, sensory nerve or vessel injury, stiffness, painful scar, poor outcome, need for additional surgical procedures, and other unforeseen complications.  \par -  In addition, the possibility of an "unsuccessful outcome," despite "successful surgery," was discussed with him.  Specifically, we discussed the chances of nonunion and hardware related complications including the possible need for removal of the hardware.  Finally, I discussed with him that he already has shortening of the thumb and with the bone resection, this will require further shortening.  He understands that his thumb will be shorter but this should stabilize the joint.\par -  The patient fully understands these risks and wishes to proceed.  \par -  I had a lengthy discussion with the patient regarding today's visit, the diagnosis, and my surgical treatment recommendations.  The patient has agreed to this plan of management and has expressed full understanding.  All questions were fully answered to the patient's satisfaction. 	 \par \par My cumulative time spent on this visit included: Preparation for the visit, review of the medical records, review of pertinent diagnostic studies, examination and counseling of the patient on the above diagnosis, treatment plan and prognosis, orders of diagnostic tests, medication and/or appropriate procedures and documentation in the medical records of today's visit.

## 2022-05-04 NOTE — END OF VISIT
[FreeTextEntry3] : This note was written by Vee Ryees on 05/04/2022 acting solely as a scribe for Dr. Vern Napier.\par  \par All medical record entries made by the Scribe were at my, Dr. Vern Napier, direction and personally dictated by me on 05/04/2022. I have personally reviewed the chart and agree that the record accurately reflects my personal performance of the history, physical exam, assessment and plan.

## 2022-05-04 NOTE — CONSULT LETTER
[Dear  ___] : Dear  [unfilled], [Consult Letter:] : I had the pleasure of evaluating your patient, [unfilled]. [Please see my note below.] : Please see my note below. [Consult Closing:] : Thank you very much for allowing me to participate in the care of this patient.  If you have any questions, please do not hesitate to contact me. [Sincerely,] : Sincerely, [FreeTextEntry3] : Vern Napier M.D.\par Surgery of the Hand and Upper Extremity\par Orthopaedic Surgery\par Chief, Hand Service, [facility]\par Director, Hand Service, [facility]\par  Of Orthopaedic Surgery, [facility] [employer] [facility] [employer] [facility]\par [facility] Email: [email or web address] Office Phone: [phone]

## 2022-05-04 NOTE — PHYSICAL EXAM
[de-identified] : - Constitutional: This is a male in no obvious distress.  \par - Psych: Patient is alert and oriented to person, place and time.  Patient has a normal mood and affect.\par - Cardiovascular: Normal pulses throughout the upper extremities.  No significant varicosities are noted in the upper extremities. \par - Neuro: Patient has normal coordination.\par - Respiratory:  Patient exhibits no evidence of shortness of breath or difficulty breathing.\par - Skin: No rashes, lesions, or other abnormalities are noted in the upper extremities.\par \par --- \par \par Examination of his right thumb demonstrates swelling at the IP joint.  He has a healed skin graft along the dorsal ulnar aspect of the IP joint.  The thumb is shortened.  He has full extension with approximately 20 degrees of IP flexion.  He has gross instability to stress testing of the IP joint ulnar collateral ligament with approximately 75 degrees of instability.  He is neurovascular intact distally at the thumb. 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

## 2022-05-04 NOTE — ADDENDUM
[FreeTextEntry1] : I, Vee Reyes, acted solely as a scribe for Dr. Napier on this date on 05/04/2022.

## 2022-05-04 NOTE — HISTORY OF PRESENT ILLNESS
[Right] : right hand dominant [FreeTextEntry1] : He comes in today for evaluation of a right thumb injury which initially occurred 40 years ago. He states he broke the thumb while playing softball but that the injury was initially misdiagnosed and he was treated with physical therapy. Eventually, a silicon  was placed at the IP joint but he states it has since degraded. 7 years ago, he reinjured the thumb while using a power tool, he severed sensory nerves and required a skin graft. He presents today with instability of the thumb and there is a notable deformity, the thumb is shortened. He is not taking medication for pain at this time.

## 2022-05-06 ENCOUNTER — NON-APPOINTMENT (OUTPATIENT)
Age: 62
End: 2022-05-06

## 2022-05-12 ENCOUNTER — OUTPATIENT (OUTPATIENT)
Dept: OUTPATIENT SERVICES | Facility: HOSPITAL | Age: 62
LOS: 1 days | End: 2022-05-12
Payer: COMMERCIAL

## 2022-05-12 VITALS
HEIGHT: 70 IN | HEART RATE: 70 BPM | TEMPERATURE: 98 F | DIASTOLIC BLOOD PRESSURE: 84 MMHG | RESPIRATION RATE: 14 BRPM | OXYGEN SATURATION: 97 % | SYSTOLIC BLOOD PRESSURE: 158 MMHG | WEIGHT: 204.81 LBS

## 2022-05-12 DIAGNOSIS — T14.8XXA OTHER INJURY OF UNSPECIFIED BODY REGION, INITIAL ENCOUNTER: ICD-10-CM

## 2022-05-12 DIAGNOSIS — Z01.818 ENCOUNTER FOR OTHER PREPROCEDURAL EXAMINATION: ICD-10-CM

## 2022-05-12 DIAGNOSIS — Z98.890 OTHER SPECIFIED POSTPROCEDURAL STATES: Chronic | ICD-10-CM

## 2022-05-12 DIAGNOSIS — M15.8 OTHER POLYOSTEOARTHRITIS: ICD-10-CM

## 2022-05-12 LAB
ALBUMIN SERPL ELPH-MCNC: 4.5 G/DL — SIGNIFICANT CHANGE UP (ref 3.3–5)
ALP SERPL-CCNC: 103 U/L — SIGNIFICANT CHANGE UP (ref 30–120)
ALT FLD-CCNC: 32 U/L DA — SIGNIFICANT CHANGE UP (ref 10–60)
ANION GAP SERPL CALC-SCNC: 7 MMOL/L — SIGNIFICANT CHANGE UP (ref 5–17)
AST SERPL-CCNC: 26 U/L — SIGNIFICANT CHANGE UP (ref 10–40)
BILIRUB SERPL-MCNC: 0.6 MG/DL — SIGNIFICANT CHANGE UP (ref 0.2–1.2)
BUN SERPL-MCNC: 16 MG/DL — SIGNIFICANT CHANGE UP (ref 7–23)
CALCIUM SERPL-MCNC: 9.4 MG/DL — SIGNIFICANT CHANGE UP (ref 8.4–10.5)
CHLORIDE SERPL-SCNC: 104 MMOL/L — SIGNIFICANT CHANGE UP (ref 96–108)
CO2 SERPL-SCNC: 28 MMOL/L — SIGNIFICANT CHANGE UP (ref 22–31)
CREAT SERPL-MCNC: 1.26 MG/DL — SIGNIFICANT CHANGE UP (ref 0.5–1.3)
EGFR: 64 ML/MIN/1.73M2 — SIGNIFICANT CHANGE UP
GLUCOSE SERPL-MCNC: 116 MG/DL — HIGH (ref 70–99)
HCT VFR BLD CALC: 43.5 % — SIGNIFICANT CHANGE UP (ref 39–50)
HGB BLD-MCNC: 15 G/DL — SIGNIFICANT CHANGE UP (ref 13–17)
MCHC RBC-ENTMCNC: 30.6 PG — SIGNIFICANT CHANGE UP (ref 27–34)
MCHC RBC-ENTMCNC: 34.5 GM/DL — SIGNIFICANT CHANGE UP (ref 32–36)
MCV RBC AUTO: 88.8 FL — SIGNIFICANT CHANGE UP (ref 80–100)
NRBC # BLD: 0 /100 WBCS — SIGNIFICANT CHANGE UP (ref 0–0)
PLATELET # BLD AUTO: 282 K/UL — SIGNIFICANT CHANGE UP (ref 150–400)
POTASSIUM SERPL-MCNC: 4.8 MMOL/L — SIGNIFICANT CHANGE UP (ref 3.5–5.3)
POTASSIUM SERPL-SCNC: 4.8 MMOL/L — SIGNIFICANT CHANGE UP (ref 3.5–5.3)
PROT SERPL-MCNC: 7.8 G/DL — SIGNIFICANT CHANGE UP (ref 6–8.3)
RBC # BLD: 4.9 M/UL — SIGNIFICANT CHANGE UP (ref 4.2–5.8)
RBC # FLD: 12.5 % — SIGNIFICANT CHANGE UP (ref 10.3–14.5)
SODIUM SERPL-SCNC: 139 MMOL/L — SIGNIFICANT CHANGE UP (ref 135–145)
WBC # BLD: 9.05 K/UL — SIGNIFICANT CHANGE UP (ref 3.8–10.5)
WBC # FLD AUTO: 9.05 K/UL — SIGNIFICANT CHANGE UP (ref 3.8–10.5)

## 2022-05-12 PROCEDURE — 80053 COMPREHEN METABOLIC PANEL: CPT

## 2022-05-12 PROCEDURE — G0463: CPT

## 2022-05-12 PROCEDURE — 36415 COLL VENOUS BLD VENIPUNCTURE: CPT

## 2022-05-12 PROCEDURE — 85027 COMPLETE CBC AUTOMATED: CPT

## 2022-05-12 NOTE — H&P PST ADULT - FALL HARM RISK - UNIVERSAL INTERVENTIONS
Bed in lowest position, wheels locked, appropriate side rails in place/Call bell, personal items and telephone in reach/Instruct patient to call for assistance before getting out of bed or chair/Non-slip footwear when patient is out of bed/[place] to call system/Physically safe environment - no spills, clutter or unnecessary equipment/Purposeful Proactive Rounding/Room/bathroom lighting operational, light cord in reach

## 2022-05-12 NOTE — H&P PST ADULT - MUSCULOSKELETAL
right thumb distal phalangeal joint/diminished strength/no joint erythema/no joint warmth/no calf tenderness/decreased ROM/decreased ROM due to pain/joint swelling details… detailed exam

## 2022-05-12 NOTE — H&P PST ADULT - HISTORY OF PRESENT ILLNESS
63 yo male presents after a dislocation of the right thumb 40 years ago and states that the dislocation was "misdiagnosed". He still had pain in the thumb and 1 year later had a scan done which revealed a "bone fragment" and he had surgery to remove the bone fragment and had a cartilage removed with a silicone implant placed". He did well until about 20 years ago when the thumb became painful again after a sprain while playing softball. He states that the "thumb has gotten progressively shorter and now is turned to the left". The thumb is now permanently dislocated. Reports mild pain only with use of the hand. Denies using any pain relief measures. 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

## 2022-05-12 NOTE — H&P PST ADULT - NSANTHOSAYNRD_GEN_A_CORE
neck inches/No. CRISTINO screening performed.  STOP BANG Legend: 0-2 = LOW Risk; 3-4 = INTERMEDIATE Risk; 5-8 = HIGH Risk neck 17 inches/No. CRISTINO screening performed.  STOP BANG Legend: 0-2 = LOW Risk; 3-4 = INTERMEDIATE Risk; 5-8 = HIGH Risk

## 2022-05-12 NOTE — H&P PST ADULT - NSICDXPASTMEDICALHX_GEN_ALL_CORE_FT
PAST MEDICAL HISTORY:  Anxiety     Bacterial gastroenteritis 2007    COVID-19 March 2020, flu-like symptoms. Never was tested but was directly exposed to covid    COVID-19 vaccine series completed     Dislocation of right thumb chronic    Diverticulosis     Elevated coronary artery calcium score 485 April 2022, follow up stress test "normal"    Fatty liver     Gluten intolerance     Hearing loss, left     Hiatal hernia no GERD    Hyperlipidemia     Hypertension March 2022    IBS (irritable bowel syndrome)     Insomnia secondary to anxiety    Lactose intolerance     Osteoarthritis     Tinnitus, left ear     Umbilical hernia      PAST MEDICAL HISTORY:  Anxiety     Bacterial gastroenteritis 2007    COVID-19 March 2020, flu-like symptoms. Never was tested but was directly exposed to covid    COVID-19 vaccine series completed     Dislocation of right thumb chronic    Diverticulosis     Elevated coronary artery calcium score 485 April 2022, follow up stress test "normal"    Fatty liver     Gluten intolerance     Hearing loss, left     Hiatal hernia no GERD    Hyperlipidemia     Hypertension March 2022    IBS (irritable bowel syndrome)     Insomnia secondary to anxiety    Lactose intolerance     Osteoarthritis     Tinnitus, left ear     Umbilical hernia     White coat syndrome with hypertension

## 2022-05-12 NOTE — H&P PST ADULT - NSICDXFAMILYHX_GEN_ALL_CORE_FT
FAMILY HISTORY:  Father  Still living? No  Family history of heart attack, Age at diagnosis: Age Unknown  Family history of type 2 diabetes mellitus, Age at diagnosis: Age Unknown    Mother  Still living? No  Family history of metastatic neoplastic disease, Age at diagnosis: Age Unknown    Sibling  Still living? Yes, Estimated age: 61-70  Family history of hyperlipidemia, Age at diagnosis: Age Unknown  Family history of hypertension, Age at diagnosis: Age Unknown

## 2022-05-12 NOTE — H&P PST ADULT - PROBLEM SELECTOR PLAN 1
right thumb interphalangeal joint fusion, removal of right thumb interphalangeal joint silicone implant. medical/cardiac clearance requested. obtain EKG, stress, cardiac calcium score and echo reports from Dr. Shirley. instructed to take amlodipine AM of surgery with sips of water. surgical wash instructions reviewed and verbalized understanding. covid PCR appt. confirmed for 5/29/22

## 2022-05-12 NOTE — H&P PST ADULT - NSICDXPASTSURGICALHX_GEN_ALL_CORE_FT
PAST SURGICAL HISTORY:  History of thumb surgery right thumb 1985 removal of bone fragments and cartilege repair and 2015 reattachement of right thumb after traumatic amutation    Status post LASIK surgery of both eyes 2002

## 2022-05-18 ENCOUNTER — APPOINTMENT (OUTPATIENT)
Dept: ALLERGY | Facility: CLINIC | Age: 62
End: 2022-05-18
Payer: COMMERCIAL

## 2022-05-18 PROCEDURE — 95076 INGEST CHALLENGE INI 120 MIN: CPT

## 2022-05-18 NOTE — ASSESSMENT
[FreeTextEntry1] : Patient tolerated his oral challenge to amoxicillin with no immediate allergic symptoms.   He will take an additional four doses of amoxicillin.   He has been advised that she/he may have a delayed allergic reaction and this has been reviewed with the patient.  If he develops any delayed allergic symptoms patient was advised to contact the office.   In addition, patient will contact the office on Monday for further instructions.\par

## 2022-05-18 NOTE — IMPRESSION
[FreeTextEntry1] : Patient tolerated his oral amoxicillin challenge with no immediate allergic reaction

## 2022-05-26 ENCOUNTER — NON-APPOINTMENT (OUTPATIENT)
Age: 62
End: 2022-05-26

## 2022-05-26 ENCOUNTER — APPOINTMENT (OUTPATIENT)
Dept: CARDIOLOGY | Facility: CLINIC | Age: 62
End: 2022-05-26
Payer: COMMERCIAL

## 2022-05-26 VITALS
BODY MASS INDEX: 29.45 KG/M2 | TEMPERATURE: 97.5 F | WEIGHT: 208 LBS | OXYGEN SATURATION: 99 % | HEART RATE: 90 BPM | HEIGHT: 70.5 IN | DIASTOLIC BLOOD PRESSURE: 88 MMHG | SYSTOLIC BLOOD PRESSURE: 150 MMHG

## 2022-05-26 VITALS
SYSTOLIC BLOOD PRESSURE: 140 MMHG | DIASTOLIC BLOOD PRESSURE: 84 MMHG | BODY MASS INDEX: 29.42 KG/M2 | TEMPERATURE: 97 F | HEART RATE: 90 BPM | OXYGEN SATURATION: 99 % | HEIGHT: 70.5 IN

## 2022-05-26 VITALS — WEIGHT: 208 LBS | BODY MASS INDEX: 29.42 KG/M2

## 2022-05-26 DIAGNOSIS — Z01.818 ENCOUNTER FOR OTHER PREPROCEDURAL EXAMINATION: ICD-10-CM

## 2022-05-26 PROCEDURE — 93000 ELECTROCARDIOGRAM COMPLETE: CPT

## 2022-05-26 PROCEDURE — 99213 OFFICE O/P EST LOW 20 MIN: CPT

## 2022-05-26 RX ORDER — CHLORHEXIDINE GLUCONATE 213 G/1000ML
1 SOLUTION TOPICAL ONCE
Refills: 0 | Status: COMPLETED | OUTPATIENT
Start: 2022-05-31 | End: 2022-05-31

## 2022-05-26 RX ORDER — CEFAZOLIN SODIUM 1 G
2000 VIAL (EA) INJECTION ONCE
Refills: 0 | Status: COMPLETED | OUTPATIENT
Start: 2022-05-31 | End: 2022-05-31

## 2022-05-26 NOTE — HISTORY OF PRESENT ILLNESS
[FreeTextEntry1] : pt presents  for pre op eval re thumb surgery .pt with anxiety htn /non obstructive cad .pt presents for cv eval prior to right thumb surgery .pt feels well s/p recent pre testing labs all ok except borderline increased glucose pt denies any chest  pain dizziness ,lightheadedness ,nausea vomiting diaphoresis\par

## 2022-05-26 NOTE — PHYSICAL EXAM

## 2022-05-29 ENCOUNTER — OUTPATIENT (OUTPATIENT)
Dept: OUTPATIENT SERVICES | Facility: HOSPITAL | Age: 62
LOS: 1 days | End: 2022-05-29
Payer: COMMERCIAL

## 2022-05-29 DIAGNOSIS — Z98.890 OTHER SPECIFIED POSTPROCEDURAL STATES: Chronic | ICD-10-CM

## 2022-05-29 DIAGNOSIS — Z20.828 CONTACT WITH AND (SUSPECTED) EXPOSURE TO OTHER VIRAL COMMUNICABLE DISEASES: ICD-10-CM

## 2022-05-29 LAB — SARS-COV-2 RNA SPEC QL NAA+PROBE: SIGNIFICANT CHANGE UP

## 2022-05-29 PROCEDURE — U0003: CPT

## 2022-05-29 PROCEDURE — 76000 FLUOROSCOPY <1 HR PHYS/QHP: CPT | Mod: 26,LT,59

## 2022-05-29 PROCEDURE — 26860 FUSION OF FINGER JOINT: CPT | Mod: FA

## 2022-05-29 PROCEDURE — 20902 REMOVAL OF BONE FOR GRAFT: CPT | Mod: RT

## 2022-05-29 PROCEDURE — U0005: CPT

## 2022-05-29 PROCEDURE — 20680 REMOVAL OF IMPLANT DEEP: CPT | Mod: FA

## 2022-05-30 ENCOUNTER — TRANSCRIPTION ENCOUNTER (OUTPATIENT)
Age: 62
End: 2022-05-30

## 2022-05-31 ENCOUNTER — NON-APPOINTMENT (OUTPATIENT)
Age: 62
End: 2022-05-31

## 2022-05-31 ENCOUNTER — OUTPATIENT (OUTPATIENT)
Dept: OUTPATIENT SERVICES | Facility: HOSPITAL | Age: 62
LOS: 1 days | End: 2022-05-31
Payer: COMMERCIAL

## 2022-05-31 ENCOUNTER — TRANSCRIPTION ENCOUNTER (OUTPATIENT)
Age: 62
End: 2022-05-31

## 2022-05-31 ENCOUNTER — RESULT REVIEW (OUTPATIENT)
Age: 62
End: 2022-05-31

## 2022-05-31 ENCOUNTER — APPOINTMENT (OUTPATIENT)
Dept: ORTHOPEDIC SURGERY | Facility: HOSPITAL | Age: 62
End: 2022-05-31

## 2022-05-31 VITALS
WEIGHT: 203.27 LBS | HEART RATE: 75 BPM | DIASTOLIC BLOOD PRESSURE: 80 MMHG | SYSTOLIC BLOOD PRESSURE: 158 MMHG | OXYGEN SATURATION: 100 % | TEMPERATURE: 98 F | RESPIRATION RATE: 15 BRPM | HEIGHT: 70 IN

## 2022-05-31 VITALS
SYSTOLIC BLOOD PRESSURE: 138 MMHG | OXYGEN SATURATION: 100 % | DIASTOLIC BLOOD PRESSURE: 76 MMHG | TEMPERATURE: 98 F | HEART RATE: 68 BPM | RESPIRATION RATE: 17 BRPM

## 2022-05-31 DIAGNOSIS — Z98.890 OTHER SPECIFIED POSTPROCEDURAL STATES: Chronic | ICD-10-CM

## 2022-05-31 DIAGNOSIS — M15.8 OTHER POLYOSTEOARTHRITIS: ICD-10-CM

## 2022-05-31 DIAGNOSIS — Z01.818 ENCOUNTER FOR OTHER PREPROCEDURAL EXAMINATION: ICD-10-CM

## 2022-05-31 PROCEDURE — 88305 TISSUE EXAM BY PATHOLOGIST: CPT | Mod: 26

## 2022-05-31 PROCEDURE — C1713: CPT

## 2022-05-31 PROCEDURE — C1889: CPT

## 2022-05-31 PROCEDURE — 88300 SURGICAL PATH GROSS: CPT

## 2022-05-31 PROCEDURE — 88305 TISSUE EXAM BY PATHOLOGIST: CPT

## 2022-05-31 PROCEDURE — 26862 FUSION/GRAFT OF FINGER JOINT: CPT | Mod: F5

## 2022-05-31 PROCEDURE — 76000 FLUOROSCOPY <1 HR PHYS/QHP: CPT

## 2022-05-31 PROCEDURE — XXXXX: CPT | Mod: 1L

## 2022-05-31 PROCEDURE — 88311 DECALCIFY TISSUE: CPT

## 2022-05-31 PROCEDURE — 88311 DECALCIFY TISSUE: CPT | Mod: 26

## 2022-05-31 PROCEDURE — 88300 SURGICAL PATH GROSS: CPT | Mod: 26,59

## 2022-05-31 DEVICE — IMPLANTABLE DEVICE: Type: IMPLANTABLE DEVICE | Site: RIGHT | Status: FUNCTIONAL

## 2022-05-31 DEVICE — GRAFT CANC CHIP 1.7 10MM 5CC: Type: IMPLANTABLE DEVICE | Site: RIGHT | Status: FUNCTIONAL

## 2022-05-31 RX ORDER — SODIUM CHLORIDE 9 MG/ML
1000 INJECTION, SOLUTION INTRAVENOUS
Refills: 0 | Status: DISCONTINUED | OUTPATIENT
Start: 2022-05-31 | End: 2022-05-31

## 2022-05-31 RX ORDER — OXYCODONE AND ACETAMINOPHEN 5; 325 MG/1; MG/1
1 TABLET ORAL
Qty: 15 | Refills: 0
Start: 2022-05-31

## 2022-05-31 RX ORDER — ACETAMINOPHEN WITH CODEINE 300MG-30MG
1 TABLET ORAL
Qty: 5 | Refills: 0
Start: 2022-05-31

## 2022-05-31 RX ORDER — IBUPROFEN 200 MG
1 TABLET ORAL
Qty: 30 | Refills: 0
Start: 2022-05-31

## 2022-05-31 RX ORDER — HYDROMORPHONE HYDROCHLORIDE 2 MG/ML
0.5 INJECTION INTRAMUSCULAR; INTRAVENOUS; SUBCUTANEOUS
Refills: 0 | Status: DISCONTINUED | OUTPATIENT
Start: 2022-05-31 | End: 2022-05-31

## 2022-05-31 RX ORDER — ONDANSETRON 8 MG/1
4 TABLET, FILM COATED ORAL ONCE
Refills: 0 | Status: DISCONTINUED | OUTPATIENT
Start: 2022-05-31 | End: 2022-05-31

## 2022-05-31 RX ADMIN — CHLORHEXIDINE GLUCONATE 1 APPLICATION(S): 213 SOLUTION TOPICAL at 07:56

## 2022-05-31 NOTE — ASU PATIENT PROFILE, ADULT - NSICDXPASTMEDICALHX_GEN_ALL_CORE_FT
PAST MEDICAL HISTORY:  Anxiety     Bacterial gastroenteritis 2007    COVID-19 March 2020, flu-like symptoms. Never was tested but was directly exposed to covid    COVID-19 vaccine series completed     Dislocation of right thumb chronic    Diverticulosis     Elevated coronary artery calcium score 485 April 2022, follow up stress test "normal"    Fatty liver     Gluten intolerance     Hearing loss, left     Hiatal hernia no GERD    Hyperlipidemia     Hypertension March 2022    IBS (irritable bowel syndrome)     Insomnia secondary to anxiety    Lactose intolerance     Osteoarthritis     Tinnitus, left ear     Umbilical hernia     White coat syndrome with hypertension

## 2022-05-31 NOTE — ASU DISCHARGE PLAN (ADULT/PEDIATRIC) - NS MD DC FALL RISK RISK
For information on Fall & Injury Prevention, visit: [email or web address] OR  [email or web address] OR  https://www.cdc.gov/steadi/patient.html

## 2022-05-31 NOTE — ASU DISCHARGE PLAN (ADULT/PEDIATRIC) - CARE PROVIDER_API CALL
Vern Napier)  Orthopaedic Surgery; Surgery of the Hand  [address], [address]  [place] [address]  Phone: [phone]  Fax: [phone]  Follow Up Time:    Vern Napier)  Orthopaedic Surgery; Surgery of the Hand  [address], [address]  [place] [address]  Phone: [phone]  Fax: [phone]  Scheduled Appointment: 06/03/2022

## 2022-06-03 ENCOUNTER — APPOINTMENT (OUTPATIENT)
Dept: ORTHOPEDIC SURGERY | Facility: CLINIC | Age: 62
End: 2022-06-03
Payer: COMMERCIAL

## 2022-06-03 DIAGNOSIS — Z98.890 OTHER SPECIFIED POSTPROCEDURAL STATES: ICD-10-CM

## 2022-06-03 PROCEDURE — 73140 X-RAY EXAM OF FINGER(S): CPT | Mod: RT,F5

## 2022-06-03 PROCEDURE — 99024 POSTOP FOLLOW-UP VISIT: CPT

## 2022-06-03 NOTE — END OF VISIT
[FreeTextEntry3] : This note was written by Vee Reyes on 06/03/2022 acting solely as a scribe for Dr. Vern Napier.\par  \par All medical record entries made by the Scribe were at my, Dr. Vern Napier, direction and personally dictated by me on 06/03/2022. I have personally reviewed the chart and agree that the record accurately reflects my personal performance of the history, physical exam, assessment and plan.

## 2022-06-03 NOTE — HISTORY OF PRESENT ILLNESS
[de-identified] : 3 days postoperative. [de-identified] : 3 days status post right thumb IP fusion, removal of silicone implant and interpositional graft from the distal radius dorsally.  Date of surgery: 5/31/2022.\par \par He is doing well overall and denies fever or chills.  [de-identified] : Examination of his right thumb and wrist after the dressing was removed demonstrates his incisions to be clean and dry.  There is swelling of the thumb.  There is no drainage or evidence of infection at either incision.  The pin sites are clean and dry.  He is neurovascular intact distally. [de-identified] : AP, lateral and oblique radiographs of his right thumb demonstrate his IP joint fusion and hardware to be well aligned. [de-identified] : Stable, 3 days postoperative. [de-identified] : The suture ends were cut from the wrist incision and Steri-Strips were applied.  A dressing was applied.  He was instructed on protection and activity modification.  We will follow-up in 1 week for suture removal.

## 2022-06-09 ENCOUNTER — APPOINTMENT (OUTPATIENT)
Dept: CARDIOLOGY | Facility: CLINIC | Age: 62
End: 2022-06-09
Payer: COMMERCIAL

## 2022-06-09 VITALS
BODY MASS INDEX: 28.92 KG/M2 | HEART RATE: 78 BPM | OXYGEN SATURATION: 98 % | DIASTOLIC BLOOD PRESSURE: 90 MMHG | TEMPERATURE: 98.6 F | HEIGHT: 70 IN | WEIGHT: 202 LBS | SYSTOLIC BLOOD PRESSURE: 140 MMHG

## 2022-06-09 PROCEDURE — 99213 OFFICE O/P EST LOW 20 MIN: CPT

## 2022-06-09 NOTE — PHYSICAL EXAM
[Well Developed] : well developed [Well Nourished] : well nourished [No Acute Distress] : no acute distress [Normal Conjunctiva] : normal conjunctiva [Normal Venous Pressure] : normal venous pressure [No Carotid Bruit] : no carotid bruit [Normal S1, S2] : normal S1, S2 [No Murmur] : no murmur [No Rub] : no rub [No Gallop] : no gallop [Clear Lung Fields] : clear lung fields [Good Air Entry] : good air entry [No Respiratory Distress] : no respiratory distress  [Soft] : abdomen soft [Non Tender] : non-tender [No Masses/organomegaly] : no masses/organomegaly [Normal Bowel Sounds] : normal bowel sounds [Normal Gait] : normal gait [No Edema] : no edema [No Cyanosis] : no cyanosis [No Clubbing] : no clubbing [No Varicosities] : no varicosities [No Rash] : no rash [No Skin Lesions] : no skin lesions [Moves all extremities] : moves all extremities [No Focal Deficits] : no focal deficits [Normal Speech] : normal speech [Alert and Oriented] : alert and oriented [Normal memory] : normal memory [de-identified] : right hand wrapped in gauze

## 2022-06-09 NOTE — HISTORY OF PRESENT ILLNESS
[FreeTextEntry1] : This is a 62 year old male with a Pmhx of anxiety, HTN HLD non obstructive CAD who presents to the office for follow up\par \par Pt reports tolerating increased Remeron well. Reports sleeping much better and BP has been more controlled at home \par at home -110/58-63\par \par Pt denies any CP, SOB, heart palpitations, dizziness, abdominal pain N/V/D fever or chills\par

## 2022-06-10 ENCOUNTER — APPOINTMENT (OUTPATIENT)
Dept: ORTHOPEDIC SURGERY | Facility: CLINIC | Age: 62
End: 2022-06-10
Payer: COMMERCIAL

## 2022-06-10 PROCEDURE — 99024 POSTOP FOLLOW-UP VISIT: CPT

## 2022-06-10 NOTE — HISTORY OF PRESENT ILLNESS
[de-identified] : 10 days postoperative. [de-identified] : 10 days status post right thumb IP fusion, removal of silicone implant and interpositional graft from the distal radius dorsally.  Date of surgery: 5/31/2022.\par \par He is doing well and has minimal pain. [de-identified] : Examination of his right thumb and wrist demonstrates his incisions to be healing well.  There is decreased swelling.  The pin sites are clean and dry.  He remains neurovascularly intact distally. [de-identified] : Stable, 10 days postoperative. [de-identified] : The suture ends removed and Steri-Strips were applied.  The pin sites were cleaned.  A dressing was applied.  He was instructed on local wound care and when to begin scar massage and desensitization at the wrist incision.  With regard to the right thumb, he was instructed on keeping the pin sites and incision clean and dry.  He will clean with twice a day with hydrogen peroxide.  He will follow-up in 2 weeks.

## 2022-06-24 ENCOUNTER — APPOINTMENT (OUTPATIENT)
Dept: ORTHOPEDIC SURGERY | Facility: CLINIC | Age: 62
End: 2022-06-24

## 2022-06-24 PROCEDURE — 73140 X-RAY EXAM OF FINGER(S): CPT | Mod: F5

## 2022-06-24 PROCEDURE — 99024 POSTOP FOLLOW-UP VISIT: CPT

## 2022-06-24 NOTE — END OF VISIT
[FreeTextEntry3] : This note was written by Vee Reyes on 06/24/2022 acting solely as a scribe for Dr. Vern Napier.\par  \par All medical record entries made by the Scribe were at my, Dr. Vern Napier, direction and personally dictated by me on 06/24/2022. I have personally reviewed the chart and agree that the record accurately reflects my personal performance of the history, physical exam, assessment and plan.

## 2022-06-24 NOTE — ADDENDUM
[FreeTextEntry1] : I, Vee Reyes, acted solely as a scribe for Dr. Napier on this date on 06/24/2022.

## 2022-06-24 NOTE — HISTORY OF PRESENT ILLNESS
[de-identified] : 24 days postoperative. [de-identified] : 24 days status post right thumb IP fusion, removal of silicone implant and interpositional graft from the distal radius dorsally.  Date of surgery: 5/31/2022.\par \par He is doing well and has minimal pain. He has been cleaning around the K-wires as instructed. He states not long after the surgery, he felt/heard a pop in the right wrist and has some residual soreness. [de-identified] : Examination of his right thumb and wrist demonstrates his incisions to be healing well.  There is decreased swelling.  The pin sites are clean and dry.  He remains neurovascularly intact distally. [de-identified] : AP, lateral, and oblique radiographs of the right thumb demonstrate good alignment of the 2 K wires and screw with interval healing of the fusion. [de-identified] : Stable, 24 days postoperative. [de-identified] : At this time, he was instructed on continued local wound care, scar massage and desensitization as well as protection of the thumb. He will follow up in 17 days for possible K-wire removal.

## 2022-07-02 ENCOUNTER — NON-APPOINTMENT (OUTPATIENT)
Age: 62
End: 2022-07-02

## 2022-07-08 ENCOUNTER — APPOINTMENT (OUTPATIENT)
Dept: GASTROENTEROLOGY | Facility: CLINIC | Age: 62
End: 2022-07-08

## 2022-07-08 VITALS
OXYGEN SATURATION: 96 % | TEMPERATURE: 98 F | HEIGHT: 70 IN | SYSTOLIC BLOOD PRESSURE: 140 MMHG | DIASTOLIC BLOOD PRESSURE: 90 MMHG | HEART RATE: 85 BPM | BODY MASS INDEX: 29.35 KG/M2 | WEIGHT: 205 LBS

## 2022-07-08 DIAGNOSIS — R13.19 OTHER DYSPHAGIA: ICD-10-CM

## 2022-07-08 PROCEDURE — 99214 OFFICE O/P EST MOD 30 MIN: CPT

## 2022-07-08 NOTE — HISTORY OF PRESENT ILLNESS
[FreeTextEntry1] : He is doing great on hyoscyamine 0.75 mg twice a day. and has no abdominal pain. He had 1 episode of food temporarily getting stuck in  his lower esophagus which resolved.  He is feeling well now and has no complaints.  He had an endoscopy 3 years ago for a similar problem which was normal.  He gets these episodes once or twice a year.

## 2022-07-08 NOTE — ASSESSMENT
[FreeTextEntry1] : Reduce hyoscyamine  to 1x/ day in the morning\par Chew food slowly and carefully  - He refuses an endoscopy  now. \par \par Office f/u in 3 months

## 2022-07-08 NOTE — CONSULT LETTER
[Dear  ___] : Dear  [unfilled], [Consult Letter:] : I had the pleasure of evaluating your patient, [unfilled]. [( Thank you for referring [unfilled] for consultation for _____ )] : Thank you for referring [unfilled] for consultation for [unfilled] [Please see my note below.] : Please see my note below. [Consult Closing:] : Thank you very much for allowing me to participate in the care of this patient.  If you have any questions, please do not hesitate to contact me. [Sincerely,] : Sincerely, [FreeTextEntry3] : Don\par \par Enrike Ken MD\par \par Gastroenterology\par [facility]\par [facility]\par \par

## 2022-07-11 ENCOUNTER — APPOINTMENT (OUTPATIENT)
Dept: ORTHOPEDIC SURGERY | Facility: CLINIC | Age: 62
End: 2022-07-11

## 2022-07-11 PROCEDURE — 20670 REMOVAL IMPLANT SUPERFICIAL: CPT | Mod: 58,LT,F5

## 2022-07-11 PROCEDURE — 99024 POSTOP FOLLOW-UP VISIT: CPT

## 2022-07-11 PROCEDURE — 73140 X-RAY EXAM OF FINGER(S): CPT | Mod: RT,F5

## 2022-07-11 NOTE — HISTORY OF PRESENT ILLNESS
[de-identified] : 31 days postoperative. [de-identified] : 31 days status post right thumb IP fusion, removal of silicone implant and interpositional graft from the distal radius dorsally.  Date of surgery: 5/31/2022.\par \par He is doing well. [de-identified] : Examination of his right thumb and wrist demonstrates his incisions to be well-healed.  There is decreased swelling.  The pin sites are clean and dry.  He remains neurovascularly intact distally, other than some numbness along the dorsal aspect of the thumb which is related to his prior injury.. [de-identified] : AP, lateral, and oblique radiographs of the right thumb demonstrate good alignment of the 2 K wires and screw.  The fusion site appears essentially fused. [de-identified] : Stable, 31 days postoperative. [de-identified] : At this time, the 2 K wires were removed without incident.  He was instructed on activity modification.  He will follow-up in 2 weeks.

## 2022-07-16 ENCOUNTER — NON-APPOINTMENT (OUTPATIENT)
Age: 62
End: 2022-07-16

## 2022-07-20 PROBLEM — M15.8 DEGENERATIVE ARTHRITIS OF INTERPHALANGEAL JOINT OF RIGHT THUMB: Status: ACTIVE | Noted: 2022-05-04

## 2022-07-27 ENCOUNTER — APPOINTMENT (OUTPATIENT)
Dept: ORTHOPEDIC SURGERY | Facility: CLINIC | Age: 62
End: 2022-07-27

## 2022-07-27 VITALS — HEIGHT: 70 IN

## 2022-07-27 DIAGNOSIS — M15.8 OTHER POLYOSTEOARTHRITIS: ICD-10-CM

## 2022-07-27 PROCEDURE — 73140 X-RAY EXAM OF FINGER(S): CPT | Mod: F5

## 2022-07-27 PROCEDURE — 99212 OFFICE O/P EST SF 10 MIN: CPT

## 2022-07-27 NOTE — HISTORY OF PRESENT ILLNESS
[de-identified] : 47 days postoperative. [de-identified] : 47 days status post right thumb IP fusion, removal of silicone implant and interpositional graft from the distal radius dorsally.  Date of surgery: 5/31/2022.\par \par The 2 K wires were removed 16 days ago.\par \par He is doing well. [de-identified] : Examination of his right thumb and wrist demonstrates his incisions to be well-healed.  There is decreased swelling.   He remains neurovascularly intact distally, other than some numbness along the dorsal aspect of the thumb which is related to his prior injury.. [de-identified] : AP, lateral, and oblique radiographs of the right thumb demonstrate good alignment of the screw.  The fusion site appears essentially fused. [de-identified] : Stable, 47 days postoperative. [de-identified] : At this time, he was instructed on continued scar massage and desensitization and mobility exercises to the MCP joint.  He will follow-up in 4 to 6 weeks if needed, according to his symptoms.

## 2022-10-04 ENCOUNTER — NON-APPOINTMENT (OUTPATIENT)
Age: 62
End: 2022-10-04

## 2022-10-04 ENCOUNTER — APPOINTMENT (OUTPATIENT)
Dept: CARDIOLOGY | Facility: CLINIC | Age: 62
End: 2022-10-04

## 2022-10-04 VITALS
SYSTOLIC BLOOD PRESSURE: 166 MMHG | TEMPERATURE: 98.3 F | HEIGHT: 70 IN | BODY MASS INDEX: 30.78 KG/M2 | OXYGEN SATURATION: 96 % | HEART RATE: 77 BPM | WEIGHT: 215 LBS | DIASTOLIC BLOOD PRESSURE: 90 MMHG

## 2022-10-04 PROCEDURE — 93000 ELECTROCARDIOGRAM COMPLETE: CPT

## 2022-10-04 PROCEDURE — 99214 OFFICE O/P EST MOD 30 MIN: CPT | Mod: 25

## 2022-10-04 NOTE — PHYSICAL EXAM

## 2022-10-04 NOTE — HISTORY OF PRESENT ILLNESS
[FreeTextEntry1] : This is a 62 year male with a Pmhx of anxiety/depression, HLD CAD who presents to the office for routine follow up. pt reports overall feeling well reports some extra stress at his job.  checks BP at home usually SBP in 120s when hes stressed SBP 140s \par pt reports when hes playing soft soft. even if hes at rest on the field  he reports his HR remains in the 120s. Denies any heart palpitations. dizziness SOB or chest pain  \par pt concerned about neck pain  and mole left side of face \par

## 2022-10-04 NOTE — REVIEW OF SYSTEMS
[Negative] : Heme/Lymph [Palpitations] : palpitations [Joint Pain] : joint pain [de-identified] : see hpi

## 2022-10-11 ENCOUNTER — APPOINTMENT (OUTPATIENT)
Dept: GASTROENTEROLOGY | Facility: CLINIC | Age: 62
End: 2022-10-11

## 2022-10-11 ENCOUNTER — APPOINTMENT (OUTPATIENT)
Dept: CARDIOLOGY | Facility: CLINIC | Age: 62
End: 2022-10-11

## 2022-10-11 VITALS
WEIGHT: 216 LBS | HEART RATE: 72 BPM | HEIGHT: 70 IN | OXYGEN SATURATION: 98 % | SYSTOLIC BLOOD PRESSURE: 162 MMHG | DIASTOLIC BLOOD PRESSURE: 84 MMHG | RESPIRATION RATE: 13 BRPM | BODY MASS INDEX: 30.92 KG/M2 | TEMPERATURE: 97.1 F

## 2022-10-11 DIAGNOSIS — K57.90 DIVERTICULOSIS OF INTESTINE, PART UNSPECIFIED, W/OUT PERFORATION OR ABSCESS W/OUT BLEEDING: ICD-10-CM

## 2022-10-11 PROCEDURE — 99214 OFFICE O/P EST MOD 30 MIN: CPT

## 2022-10-11 RX ORDER — HYOSCYAMINE SULFATE 0.38 MG/1
0.38 TABLET, EXTENDED RELEASE ORAL
Qty: 180 | Refills: 2 | Status: ACTIVE | COMMUNITY
Start: 2022-10-11 | End: 1900-01-01

## 2022-10-12 PROBLEM — K57.90 DIVERTICULOSIS: Status: ACTIVE | Noted: 2022-03-21

## 2022-10-12 NOTE — HISTORY OF PRESENT ILLNESS
[FreeTextEntry1] : He had tapered himself off of hyoscyamine , but recently his stress level increased , so he went back to 1x/ day He is feeling much better with this., but is still symptomatic with crampy diffuse abdominal pain with abdominal bloating.

## 2022-10-12 NOTE — REASON FOR VISIT
[FreeTextEntry1] : Increased stree so was off hyos but hossein tback dimitris it  Adviused ti gfi ti 2x./da y the taper

## 2022-10-12 NOTE — ASSESSMENT
[FreeTextEntry1] : IBS exacerbation\par \par Increase hyoscyamine to 2x/ day and then taper down to 1x/ day when his stress level decreases\par \par Office f/u in 2 to 3  months

## 2022-10-12 NOTE — CONSULT LETTER
[Dear  ___] : Dear  [unfilled], [Consult Letter:] : I had the pleasure of evaluating your patient, [unfilled]. [( Thank you for referring [unfilled] for consultation for _____ )] : Thank you for referring [unfilled] for consultation for [unfilled] [Please see my note below.] : Please see my note below. [Consult Closing:] : Thank you very much for allowing me to participate in the care of this patient.  If you have any questions, please do not hesitate to contact me. [Sincerely,] : Sincerely, [FreeTextEntry3] : Don\par \par Enrike Ken MD\par \par Gastroenterology\par [facility]\par [facility]\par \par

## 2022-10-23 PROCEDURE — 93241 XTRNL ECG REC>48HR<7D: CPT

## 2022-10-26 PROBLEM — Z00.00 ENCOUNTER FOR PREVENTIVE HEALTH EXAMINATION: Status: ACTIVE | Noted: 2022-10-26

## 2022-11-03 ENCOUNTER — APPOINTMENT (OUTPATIENT)
Dept: DERMATOLOGY | Facility: CLINIC | Age: 62
End: 2022-11-03

## 2022-11-03 VITALS — BODY MASS INDEX: 30.06 KG/M2 | WEIGHT: 210 LBS | HEIGHT: 70 IN

## 2022-11-03 DIAGNOSIS — Z12.83 ENCOUNTER FOR SCREENING FOR MALIGNANT NEOPLASM OF SKIN: ICD-10-CM

## 2022-11-03 DIAGNOSIS — L82.1 OTHER SEBORRHEIC KERATOSIS: ICD-10-CM

## 2022-11-03 DIAGNOSIS — D22.9 MELANOCYTIC NEVI, UNSPECIFIED: ICD-10-CM

## 2022-11-03 PROCEDURE — 99203 OFFICE O/P NEW LOW 30 MIN: CPT

## 2022-11-15 ENCOUNTER — APPOINTMENT (OUTPATIENT)
Dept: CARDIOLOGY | Facility: CLINIC | Age: 62
End: 2022-11-15

## 2022-11-15 VITALS
HEART RATE: 74 BPM | DIASTOLIC BLOOD PRESSURE: 90 MMHG | BODY MASS INDEX: 30.35 KG/M2 | SYSTOLIC BLOOD PRESSURE: 140 MMHG | HEIGHT: 70 IN | OXYGEN SATURATION: 98 % | WEIGHT: 212 LBS

## 2022-11-15 DIAGNOSIS — M54.2 CERVICALGIA: ICD-10-CM

## 2022-11-15 DIAGNOSIS — R63.5 ABNORMAL WEIGHT GAIN: ICD-10-CM

## 2022-11-15 PROCEDURE — 99213 OFFICE O/P EST LOW 20 MIN: CPT

## 2022-11-15 NOTE — HISTORY OF PRESENT ILLNESS
[FreeTextEntry1] : This is a 62 year old  male with a Pmhx of anxiety/depression, HLD CAD IBS  who presents to the office for follow up. \par pt reports tolerating increase ARB well. pt reports he takes his BP at home and its around 120/80. \par pt denies any chest  pain dizziness ,lightheadedness ,nausea vomiting diaphoresis\par \par

## 2023-01-12 ENCOUNTER — APPOINTMENT (OUTPATIENT)
Dept: GASTROENTEROLOGY | Facility: CLINIC | Age: 63
End: 2023-01-12
Payer: COMMERCIAL

## 2023-01-12 VITALS
SYSTOLIC BLOOD PRESSURE: 140 MMHG | BODY MASS INDEX: 29.68 KG/M2 | HEART RATE: 67 BPM | OXYGEN SATURATION: 98 % | HEIGHT: 71 IN | WEIGHT: 212 LBS | DIASTOLIC BLOOD PRESSURE: 90 MMHG

## 2023-01-12 PROCEDURE — 99213 OFFICE O/P EST LOW 20 MIN: CPT

## 2023-01-12 RX ORDER — HYOSCYAMINE SULFATE 0.38 MG/1
0.38 TABLET, EXTENDED RELEASE ORAL
Qty: 180 | Refills: 2 | Status: ACTIVE | COMMUNITY
Start: 2023-01-12 | End: 1900-01-01

## 2023-01-12 NOTE — CONSULT LETTER
[Dear  ___] : Dear  [unfilled], [Consult Letter:] : I had the pleasure of evaluating your patient, [unfilled]. [( Thank you for referring [unfilled] for consultation for _____ )] : Thank you for referring [unfilled] for consultation for [unfilled] [Please see my note below.] : Please see my note below. [Consult Closing:] : Thank you very much for allowing me to participate in the care of this patient.  If you have any questions, please do not hesitate to contact me. [Sincerely,] : Sincerely, [FreeTextEntry3] : Don\par \par Enrike Ken MD\par \par Gastroenterology\par [facility]\par [facility]\par \par

## 2023-01-12 NOTE — ASSESSMENT
[FreeTextEntry1] : Continue medications for now as he anticipates he will have marked increased stress in the next few months\par \par Office f/u in 6 months

## 2023-01-12 NOTE — HISTORY OF PRESENT ILLNESS
[FreeTextEntry1] : He is doing great with hyoscyamine 0.375 mg . He takes it once a day but when he has increased stress, he increases his dose to 2x/ day which provides him symptomatic relief.

## 2023-02-14 RX ORDER — ASPIRIN/CALCIUM CARB/MAGNESIUM 324 MG
1 TABLET ORAL
Qty: 0 | Refills: 0 | DISCHARGE

## 2023-02-14 RX ORDER — MIRTAZAPINE 45 MG/1
1 TABLET, ORALLY DISINTEGRATING ORAL
Qty: 0 | Refills: 0 | DISCHARGE

## 2023-02-14 RX ORDER — LOSARTAN POTASSIUM 100 MG/1
1 TABLET, FILM COATED ORAL
Qty: 0 | Refills: 0 | DISCHARGE

## 2023-02-14 RX ORDER — ATORVASTATIN CALCIUM 80 MG/1
1 TABLET, FILM COATED ORAL
Qty: 0 | Refills: 0 | DISCHARGE

## 2023-02-14 RX ORDER — HYOSCYAMINE SULFATE 0.13 MG
1 TABLET ORAL
Qty: 0 | Refills: 0 | DISCHARGE

## 2023-02-14 RX ORDER — AMLODIPINE BESYLATE 2.5 MG/1
1 TABLET ORAL
Qty: 0 | Refills: 0 | DISCHARGE

## 2023-03-14 ENCOUNTER — NON-APPOINTMENT (OUTPATIENT)
Age: 63
End: 2023-03-14

## 2023-03-14 ENCOUNTER — APPOINTMENT (OUTPATIENT)
Dept: CARDIOLOGY | Facility: CLINIC | Age: 63
End: 2023-03-14
Payer: MEDICARE

## 2023-03-14 VITALS
HEART RATE: 67 BPM | WEIGHT: 214 LBS | DIASTOLIC BLOOD PRESSURE: 84 MMHG | HEIGHT: 71 IN | SYSTOLIC BLOOD PRESSURE: 154 MMHG | OXYGEN SATURATION: 97 % | TEMPERATURE: 97.8 F | BODY MASS INDEX: 29.96 KG/M2

## 2023-03-14 DIAGNOSIS — Z80.42 FAMILY HISTORY OF MALIGNANT NEOPLASM OF PROSTATE: ICD-10-CM

## 2023-03-14 DIAGNOSIS — R42 DIZZINESS AND GIDDINESS: ICD-10-CM

## 2023-03-14 DIAGNOSIS — Z12.83 ENCOUNTER FOR SCREENING FOR MALIGNANT NEOPLASM OF SKIN: ICD-10-CM

## 2023-03-14 DIAGNOSIS — Z00.00 ENCOUNTER FOR GENERAL ADULT MEDICAL EXAMINATION W/OUT ABNORMAL FINDINGS: ICD-10-CM

## 2023-03-14 DIAGNOSIS — Z12.5 ENCOUNTER FOR SCREENING FOR MALIGNANT NEOPLASM OF PROSTATE: ICD-10-CM

## 2023-03-14 DIAGNOSIS — D22.9 MELANOCYTIC NEVI, UNSPECIFIED: ICD-10-CM

## 2023-03-14 PROCEDURE — 93000 ELECTROCARDIOGRAM COMPLETE: CPT

## 2023-03-14 PROCEDURE — 99214 OFFICE O/P EST MOD 30 MIN: CPT

## 2023-03-14 NOTE — PHYSICAL EXAM
[Well Developed] : well developed [Well Nourished] : well nourished [No Acute Distress] : no acute distress [Normal Conjunctiva] : normal conjunctiva [Normal Venous Pressure] : normal venous pressure [No Carotid Bruit] : no carotid bruit [Normal S1, S2] : normal S1, S2 [No Murmur] : no murmur [No Rub] : no rub [No Gallop] : no gallop [Clear Lung Fields] : clear lung fields [Good Air Entry] : good air entry [No Respiratory Distress] : no respiratory distress  [Soft] : abdomen soft [Non Tender] : non-tender [No Masses/organomegaly] : no masses/organomegaly [Normal Bowel Sounds] : normal bowel sounds [Normal Gait] : normal gait [No Edema] : no edema [No Cyanosis] : no cyanosis [No Clubbing] : no clubbing [No Varicosities] : no varicosities [No Rash] : no rash [No Skin Lesions] : no skin lesions [Moves all extremities] : moves all extremities [No Focal Deficits] : no focal deficits [Normal Speech] : normal speech [Alert and Oriented] : alert and oriented [Normal memory] : normal memory [de-identified] : normal prostate and genitals  [de-identified] : nevi

## 2023-03-14 NOTE — HISTORY OF PRESENT ILLNESS
[FreeTextEntry1] : This is a 62 year male with a Pmhx of anxiety/depression, HLD CAD who presents to the office for routine physical . pt reports overall feeling well. pt with fhx of prostate cancer up to date on colonoscopy \par Denies any heart palpitations. SOB or chest pain  pt with one episode of dizziness 1-2 weeks ago \par resolved after one day

## 2023-05-22 ENCOUNTER — TRANSCRIPTION ENCOUNTER (OUTPATIENT)
Age: 63
End: 2023-05-22

## 2023-05-26 ENCOUNTER — APPOINTMENT (OUTPATIENT)
Dept: GASTROENTEROLOGY | Facility: CLINIC | Age: 63
End: 2023-05-26
Payer: MEDICARE

## 2023-05-26 VITALS
SYSTOLIC BLOOD PRESSURE: 162 MMHG | HEART RATE: 57 BPM | BODY MASS INDEX: 30.1 KG/M2 | OXYGEN SATURATION: 98 % | WEIGHT: 215 LBS | HEIGHT: 71 IN | DIASTOLIC BLOOD PRESSURE: 88 MMHG

## 2023-05-26 DIAGNOSIS — R14.0 ABDOMINAL DISTENSION (GASEOUS): ICD-10-CM

## 2023-05-26 PROBLEM — U07.1 COVID-19: Chronic | Status: ACTIVE | Noted: 2022-05-12

## 2023-05-26 PROBLEM — K42.9 UMBILICAL HERNIA WITHOUT OBSTRUCTION OR GANGRENE: Chronic | Status: ACTIVE | Noted: 2022-05-12

## 2023-05-26 PROBLEM — H91.92 UNSPECIFIED HEARING LOSS, LEFT EAR: Chronic | Status: ACTIVE | Noted: 2022-05-12

## 2023-05-26 PROBLEM — M19.90 UNSPECIFIED OSTEOARTHRITIS, UNSPECIFIED SITE: Chronic | Status: ACTIVE | Noted: 2022-05-12

## 2023-05-26 PROBLEM — K44.9 DIAPHRAGMATIC HERNIA WITHOUT OBSTRUCTION OR GANGRENE: Chronic | Status: ACTIVE | Noted: 2022-05-12

## 2023-05-26 PROBLEM — F41.9 ANXIETY DISORDER, UNSPECIFIED: Chronic | Status: ACTIVE | Noted: 2022-03-17

## 2023-05-26 PROBLEM — S63.104A: Chronic | Status: ACTIVE | Noted: 2022-05-12

## 2023-05-26 PROBLEM — K58.9 IRRITABLE BOWEL SYNDROME, UNSPECIFIED: Chronic | Status: ACTIVE | Noted: 2022-03-17

## 2023-05-26 PROBLEM — A04.9 BACTERIAL INTESTINAL INFECTION, UNSPECIFIED: Chronic | Status: ACTIVE | Noted: 2022-05-12

## 2023-05-26 PROBLEM — R93.1 ABNORMAL FINDINGS ON DIAGNOSTIC IMAGING OF HEART AND CORONARY CIRCULATION: Chronic | Status: ACTIVE | Noted: 2022-05-12

## 2023-05-26 PROBLEM — K58.9 IRRITABLE BOWEL SYNDROME WITHOUT DIARRHEA: Chronic | Status: ACTIVE | Noted: 2022-03-17

## 2023-05-26 PROBLEM — H93.12 TINNITUS, LEFT EAR: Chronic | Status: ACTIVE | Noted: 2022-05-12

## 2023-05-26 PROBLEM — G47.00 INSOMNIA, UNSPECIFIED: Chronic | Status: ACTIVE | Noted: 2022-05-12

## 2023-05-26 PROBLEM — K90.41 NON-CELIAC GLUTEN SENSITIVITY: Chronic | Status: ACTIVE | Noted: 2022-03-17

## 2023-05-26 PROBLEM — E78.5 HYPERLIPIDEMIA, UNSPECIFIED: Chronic | Status: ACTIVE | Noted: 2022-05-12

## 2023-05-26 PROBLEM — K57.90 DIVERTICULOSIS OF INTESTINE, PART UNSPECIFIED, WITHOUT PERFORATION OR ABSCESS WITHOUT BLEEDING: Chronic | Status: ACTIVE | Noted: 2022-05-12

## 2023-05-26 PROBLEM — E73.9 LACTOSE INTOLERANCE, UNSPECIFIED: Chronic | Status: ACTIVE | Noted: 2022-03-17

## 2023-05-26 PROBLEM — Z92.29 PERSONAL HISTORY OF OTHER DRUG THERAPY: Chronic | Status: ACTIVE | Noted: 2022-05-12

## 2023-05-26 PROBLEM — I10 ESSENTIAL (PRIMARY) HYPERTENSION: Chronic | Status: ACTIVE | Noted: 2022-05-12

## 2023-05-26 PROBLEM — K76.0 FATTY (CHANGE OF) LIVER, NOT ELSEWHERE CLASSIFIED: Chronic | Status: ACTIVE | Noted: 2022-05-12

## 2023-05-26 PROCEDURE — 99213 OFFICE O/P EST LOW 20 MIN: CPT

## 2023-06-01 PROBLEM — R14.0 ABDOMINAL BLOATING: Status: ACTIVE | Noted: 2022-10-12

## 2023-06-01 NOTE — HISTORY OF PRESENT ILLNESS
[FreeTextEntry1] : He has weaned himself off hyoscyamine and is doing great without any complaints. On occasion, he will have slight crampy abdominal pain which resolves. .His last colonoscopy  was 10/10/2019 in which 1 polyp was removed. \par

## 2023-06-01 NOTE — CONSULT LETTER
[Dear  ___] : Dear  [unfilled], [Consult Letter:] : I had the pleasure of evaluating your patient, [unfilled]. [( Thank you for referring [unfilled] for consultation for _____ )] : Thank you for referring [unfilled] for consultation for [unfilled] [Please see my note below.] : Please see my note below. [Consult Closing:] : Thank you very much for allowing me to participate in the care of this patient.  If you have any questions, please do not hesitate to contact me. [Sincerely,] : Sincerely, [FreeTextEntry3] : Don\par \par Enrike Ken MD\par \par Gastroenterology\par [facility]\par [facility]\par \par

## 2023-06-01 NOTE — ASSESSMENT
[FreeTextEntry1] : Repeat colonoscopy in October of next year\par Take hyoscyamine if needed\par \par Office follow up in 6 months \par \par \par I spent 24 minutes with the patient and answered all of his questions.

## 2023-06-26 RX ORDER — AMLODIPINE BESYLATE 5 MG/1
5 TABLET ORAL DAILY
Qty: 90 | Refills: 3 | Status: ACTIVE | COMMUNITY
Start: 2022-03-18 | End: 1900-01-01

## 2023-09-14 ENCOUNTER — APPOINTMENT (OUTPATIENT)
Dept: CARDIOLOGY | Facility: CLINIC | Age: 63
End: 2023-09-14
Payer: MEDICARE

## 2023-09-14 ENCOUNTER — APPOINTMENT (OUTPATIENT)
Dept: CARDIOLOGY | Facility: CLINIC | Age: 63
End: 2023-09-14

## 2023-09-14 VITALS
WEIGHT: 216 LBS | OXYGEN SATURATION: 99 % | HEIGHT: 71 IN | BODY MASS INDEX: 30.24 KG/M2 | SYSTOLIC BLOOD PRESSURE: 148 MMHG | HEART RATE: 67 BPM | DIASTOLIC BLOOD PRESSURE: 80 MMHG | TEMPERATURE: 97.9 F

## 2023-09-14 DIAGNOSIS — R94.31 ABNORMAL ELECTROCARDIOGRAM [ECG] [EKG]: ICD-10-CM

## 2023-09-14 DIAGNOSIS — Z71.85 ENCOUNTER FOR IMMUNIZATION SAFETY COUNSELING: ICD-10-CM

## 2023-09-14 PROCEDURE — 78452 HT MUSCLE IMAGE SPECT MULT: CPT

## 2023-09-14 PROCEDURE — 99214 OFFICE O/P EST MOD 30 MIN: CPT | Mod: 25

## 2023-09-14 PROCEDURE — 93000 ELECTROCARDIOGRAM COMPLETE: CPT | Mod: 59

## 2023-09-14 PROCEDURE — 93015 CV STRESS TEST SUPVJ I&R: CPT

## 2023-09-14 PROCEDURE — A9500: CPT

## 2023-09-15 ENCOUNTER — APPOINTMENT (OUTPATIENT)
Dept: CARDIOLOGY | Facility: CLINIC | Age: 63
End: 2023-09-15
Payer: MEDICARE

## 2023-09-15 PROCEDURE — 93306 TTE W/DOPPLER COMPLETE: CPT

## 2023-09-20 ENCOUNTER — APPOINTMENT (OUTPATIENT)
Dept: CARDIOLOGY | Facility: CLINIC | Age: 63
End: 2023-09-20

## 2023-09-29 ENCOUNTER — NON-APPOINTMENT (OUTPATIENT)
Age: 63
End: 2023-09-29

## 2023-09-29 PROCEDURE — 93241 XTRNL ECG REC>48HR<7D: CPT

## 2023-10-02 ENCOUNTER — NON-APPOINTMENT (OUTPATIENT)
Age: 63
End: 2023-10-02

## 2023-10-18 ENCOUNTER — APPOINTMENT (OUTPATIENT)
Dept: ELECTROPHYSIOLOGY | Facility: CLINIC | Age: 63
End: 2023-10-18
Payer: MEDICARE

## 2023-10-18 ENCOUNTER — NON-APPOINTMENT (OUTPATIENT)
Age: 63
End: 2023-10-18

## 2023-10-18 VITALS
SYSTOLIC BLOOD PRESSURE: 137 MMHG | DIASTOLIC BLOOD PRESSURE: 76 MMHG | HEIGHT: 71 IN | BODY MASS INDEX: 29.96 KG/M2 | OXYGEN SATURATION: 98 % | WEIGHT: 214 LBS | HEART RATE: 67 BPM

## 2023-10-18 VITALS — DIASTOLIC BLOOD PRESSURE: 71 MMHG | SYSTOLIC BLOOD PRESSURE: 153 MMHG

## 2023-10-18 PROCEDURE — 99214 OFFICE O/P EST MOD 30 MIN: CPT

## 2023-10-18 PROCEDURE — 93000 ELECTROCARDIOGRAM COMPLETE: CPT

## 2023-10-18 RX ORDER — AMOXICILLIN 250 MG/5ML
250 POWDER, FOR SUSPENSION ORAL 3 TIMES DAILY
Qty: 1 | Refills: 0 | Status: DISCONTINUED | COMMUNITY
Start: 2022-05-02 | End: 2023-10-18

## 2023-10-23 ENCOUNTER — NON-APPOINTMENT (OUTPATIENT)
Age: 63
End: 2023-10-23

## 2023-10-27 ENCOUNTER — APPOINTMENT (OUTPATIENT)
Dept: PULMONOLOGY | Facility: CLINIC | Age: 63
End: 2023-10-27
Payer: MEDICARE

## 2023-10-27 VITALS
HEART RATE: 60 BPM | SYSTOLIC BLOOD PRESSURE: 161 MMHG | WEIGHT: 213 LBS | OXYGEN SATURATION: 99 % | TEMPERATURE: 98.2 F | HEIGHT: 71 IN | DIASTOLIC BLOOD PRESSURE: 97 MMHG | BODY MASS INDEX: 29.82 KG/M2

## 2023-10-27 DIAGNOSIS — G47.33 OBSTRUCTIVE SLEEP APNEA (ADULT) (PEDIATRIC): ICD-10-CM

## 2023-10-27 DIAGNOSIS — Z87.828 PERSONAL HISTORY OF OTHER (HEALED) PHYSICAL INJURY AND TRAUMA: ICD-10-CM

## 2023-10-27 PROCEDURE — 99203 OFFICE O/P NEW LOW 30 MIN: CPT

## 2023-10-27 RX ORDER — CHLORHEXIDINE GLUCONATE, 0.12% ORAL RINSE 1.2 MG/ML
0.12 SOLUTION DENTAL
Qty: 946 | Refills: 0 | Status: COMPLETED | COMMUNITY
Start: 2023-10-09

## 2023-10-27 RX ORDER — HYOSCYAMINE SULFATE 0.38 MG/1
0.38 TABLET, EXTENDED RELEASE ORAL
Qty: 60 | Refills: 5 | Status: COMPLETED | COMMUNITY
Start: 2022-03-21 | End: 2023-10-27

## 2023-11-28 ENCOUNTER — APPOINTMENT (OUTPATIENT)
Dept: GASTROENTEROLOGY | Facility: CLINIC | Age: 63
End: 2023-11-28
Payer: MEDICARE

## 2023-11-28 VITALS
BODY MASS INDEX: 29.96 KG/M2 | WEIGHT: 214 LBS | HEART RATE: 70 BPM | RESPIRATION RATE: 16 BRPM | OXYGEN SATURATION: 98 % | DIASTOLIC BLOOD PRESSURE: 94 MMHG | HEIGHT: 71 IN | TEMPERATURE: 97.8 F | SYSTOLIC BLOOD PRESSURE: 152 MMHG

## 2023-11-28 DIAGNOSIS — Z12.11 ENCOUNTER FOR SCREENING FOR MALIGNANT NEOPLASM OF COLON: ICD-10-CM

## 2023-11-28 DIAGNOSIS — K58.9 IRRITABLE BOWEL SYNDROME W/OUT DIARRHEA: ICD-10-CM

## 2023-11-28 DIAGNOSIS — Z86.010 PERSONAL HISTORY OF COLONIC POLYPS: ICD-10-CM

## 2023-11-28 PROCEDURE — 99213 OFFICE O/P EST LOW 20 MIN: CPT

## 2023-11-28 RX ORDER — HYOSCYAMINE SULFATE 0.38 MG/1
0.38 TABLET, EXTENDED RELEASE ORAL
Qty: 60 | Refills: 2 | Status: ACTIVE | COMMUNITY
Start: 2023-11-28 | End: 1900-01-01

## 2023-12-01 ENCOUNTER — APPOINTMENT (OUTPATIENT)
Dept: PULMONOLOGY | Facility: CLINIC | Age: 63
End: 2023-12-01
Payer: MEDICARE

## 2023-12-01 VITALS
SYSTOLIC BLOOD PRESSURE: 158 MMHG | BODY MASS INDEX: 29.96 KG/M2 | HEIGHT: 71 IN | DIASTOLIC BLOOD PRESSURE: 85 MMHG | OXYGEN SATURATION: 97 % | WEIGHT: 214 LBS | HEART RATE: 67 BPM | TEMPERATURE: 98.3 F

## 2023-12-01 PROCEDURE — 99212 OFFICE O/P EST SF 10 MIN: CPT

## 2023-12-09 NOTE — H&P PST ADULT - NS SC CAGE ALCOHOL EYE OPENER
Rest, ice, compression, elevation. Can continue to bear weight as tolerated. Continue OTC medications as needed for pain relief. Follow up with PCP or return to ER as needed.
no

## 2024-01-04 ENCOUNTER — TRANSCRIPTION ENCOUNTER (OUTPATIENT)
Age: 64
End: 2024-01-04

## 2024-01-06 ENCOUNTER — RX RENEWAL (OUTPATIENT)
Age: 64
End: 2024-01-06

## 2024-03-19 ENCOUNTER — APPOINTMENT (OUTPATIENT)
Dept: CARDIOLOGY | Facility: CLINIC | Age: 64
End: 2024-03-19
Payer: MEDICARE

## 2024-03-19 ENCOUNTER — RESULT REVIEW (OUTPATIENT)
Age: 64
End: 2024-03-19

## 2024-03-19 VITALS
OXYGEN SATURATION: 97 % | WEIGHT: 213.31 LBS | RESPIRATION RATE: 17 BRPM | TEMPERATURE: 97.7 F | HEART RATE: 74 BPM | HEIGHT: 71 IN | SYSTOLIC BLOOD PRESSURE: 146 MMHG | BODY MASS INDEX: 29.86 KG/M2 | DIASTOLIC BLOOD PRESSURE: 80 MMHG

## 2024-03-19 DIAGNOSIS — Z12.11 ENCOUNTER FOR SCREENING FOR MALIGNANT NEOPLASM OF COLON: ICD-10-CM

## 2024-03-19 DIAGNOSIS — R00.0 TACHYCARDIA, UNSPECIFIED: ICD-10-CM

## 2024-03-19 DIAGNOSIS — I44.1 ATRIOVENTRICULAR BLOCK, SECOND DEGREE: ICD-10-CM

## 2024-03-19 DIAGNOSIS — R10.9 UNSPECIFIED ABDOMINAL PAIN: ICD-10-CM

## 2024-03-19 DIAGNOSIS — Z13.228 ENCOUNTER FOR SCREENING FOR OTHER METABOLIC DISORDERS: ICD-10-CM

## 2024-03-19 DIAGNOSIS — E55.9 VITAMIN D DEFICIENCY, UNSPECIFIED: ICD-10-CM

## 2024-03-19 DIAGNOSIS — E78.5 HYPERLIPIDEMIA, UNSPECIFIED: ICD-10-CM

## 2024-03-19 DIAGNOSIS — F41.9 ANXIETY DISORDER, UNSPECIFIED: ICD-10-CM

## 2024-03-19 DIAGNOSIS — R73.03 PREDIABETES.: ICD-10-CM

## 2024-03-19 DIAGNOSIS — R06.00 DYSPNEA, UNSPECIFIED: ICD-10-CM

## 2024-03-19 DIAGNOSIS — I25.10 ATHEROSCLEROTIC HEART DISEASE OF NATIVE CORONARY ARTERY W/OUT ANGINA PECTORIS: ICD-10-CM

## 2024-03-19 DIAGNOSIS — R03.0 ELEVATED BLOOD-PRESSURE READING, W/OUT DIAGNOSIS OF HYPERTENSION: ICD-10-CM

## 2024-03-19 DIAGNOSIS — F32.A ANXIETY DISORDER, UNSPECIFIED: ICD-10-CM

## 2024-03-19 PROCEDURE — 93000 ELECTROCARDIOGRAM COMPLETE: CPT

## 2024-03-19 PROCEDURE — 99214 OFFICE O/P EST MOD 30 MIN: CPT

## 2024-03-19 PROCEDURE — G2211 COMPLEX E/M VISIT ADD ON: CPT

## 2024-03-19 RX ORDER — ATORVASTATIN CALCIUM 40 MG/1
40 TABLET, FILM COATED ORAL
Qty: 90 | Refills: 2 | Status: ACTIVE | COMMUNITY
Start: 2022-03-31 | End: 1900-01-01

## 2024-03-19 RX ORDER — LOSARTAN POTASSIUM 50 MG/1
50 TABLET, FILM COATED ORAL
Qty: 180 | Refills: 0 | Status: ACTIVE | COMMUNITY
Start: 2022-03-18 | End: 1900-01-01

## 2024-03-19 NOTE — HISTORY OF PRESENT ILLNESS
[FreeTextEntry1] : This is a 64 y/o male with a pmhx of anxiety/depression, HLD CAD who presents to the office for a follow up. Patient is s/p extended holter 9/2023 Sinus Rhythm with episodes of 2nd degree AVB  He was referred to pulm for sleep study which showed no sleep apnea. He was seen by Dr. Cantu who reported patient does not require PPM. Patient reports his HR goes as low at 45 and breaths per minute are at 8. Patient reports intermittent cramps of right upper abdomen 6/10 which is happening more often. Symptoms have no specific pattern. Sometimes it occurs when bending over. He reports blood sugar was elevated at 185 after feeling that abdominal cramp. Patient reports last week he noted an abnormal sensation described as a vibration in the RUQ. He denies n/v/c/d, blood in stool. Patient reports mild dyspnea after walking up 4 flights of stairs. He reports at the gym he does the bicycle for 45 mins with no issues.  Patient denies chest pain palpitations, dizziness, syncope, changes in bowel/bladder habits or appetite.

## 2024-03-19 NOTE — REVIEW OF SYSTEMS
[Negative] : Heme/Lymph [Abdominal Pain] : abdominal pain [Nausea] : no nausea [Vomiting] : no vomiting [Change in Appetite] : no change in appetite [Heartburn] : no heartburn [Change In The Stool] : no change in stool [Dysphagia] : no dysphagia [Diarrhea] : diarrhea [Constipation] : no constipation [Blood in stool] : no blood in stoo

## 2024-03-19 NOTE — PHYSICAL EXAM
[Well Developed] : well developed [Well Nourished] : well nourished [No Acute Distress] : no acute distress [Normal Venous Pressure] : normal venous pressure [Normal Conjunctiva] : normal conjunctiva [Normal S1, S2] : normal S1, S2 [No Carotid Bruit] : no carotid bruit [No Murmur] : no murmur [No Rub] : no rub [No Gallop] : no gallop [Clear Lung Fields] : clear lung fields [No Respiratory Distress] : no respiratory distress  [Good Air Entry] : good air entry [Soft] : abdomen soft [Non Tender] : non-tender [No Masses/organomegaly] : no masses/organomegaly [Normal Bowel Sounds] : normal bowel sounds [Normal Gait] : normal gait [No Edema] : no edema [No Cyanosis] : no cyanosis [No Clubbing] : no clubbing [No Varicosities] : no varicosities [No Rash] : no rash [No Skin Lesions] : no skin lesions [Moves all extremities] : moves all extremities [No Focal Deficits] : no focal deficits [Normal Speech] : normal speech [Alert and Oriented] : alert and oriented [Normal memory] : normal memory [de-identified] : mild diffuse abdominal discomfort no rebound

## 2024-03-20 ENCOUNTER — APPOINTMENT (OUTPATIENT)
Dept: CT IMAGING | Facility: IMAGING CENTER | Age: 64
End: 2024-03-20
Payer: MEDICARE

## 2024-03-20 ENCOUNTER — OUTPATIENT (OUTPATIENT)
Dept: OUTPATIENT SERVICES | Facility: HOSPITAL | Age: 64
LOS: 1 days | End: 2024-03-20
Payer: MEDICARE

## 2024-03-20 DIAGNOSIS — Z98.890 OTHER SPECIFIED POSTPROCEDURAL STATES: Chronic | ICD-10-CM

## 2024-03-20 DIAGNOSIS — R10.9 UNSPECIFIED ABDOMINAL PAIN: ICD-10-CM

## 2024-03-20 PROCEDURE — 74177 CT ABD & PELVIS W/CONTRAST: CPT | Mod: 26,MH

## 2024-03-20 PROCEDURE — 74177 CT ABD & PELVIS W/CONTRAST: CPT

## 2024-03-21 LAB
25(OH)D3 SERPL-MCNC: 33.3 NG/ML
ALBUMIN SERPL ELPH-MCNC: 4.7 G/DL
ALP BLD-CCNC: 102 U/L
ALT SERPL-CCNC: 30 U/L
ANION GAP SERPL CALC-SCNC: 15 MMOL/L
APPEARANCE: CLEAR
AST SERPL-CCNC: 23 U/L
BACTERIA: NEGATIVE /HPF
BASOPHILS # BLD AUTO: 0.05 K/UL
BASOPHILS NFR BLD AUTO: 0.6 %
BILIRUB DIRECT SERPL-MCNC: 0.1 MG/DL
BILIRUB INDIRECT SERPL-MCNC: 0.2 MG/DL
BILIRUB SERPL-MCNC: 0.4 MG/DL
BILIRUBIN URINE: NEGATIVE
BLOOD URINE: NEGATIVE
BUN SERPL-MCNC: 13 MG/DL
CALCIUM SERPL-MCNC: 10 MG/DL
CAST: 0 /LPF
CHLORIDE SERPL-SCNC: 104 MMOL/L
CHOLEST SERPL-MCNC: 146 MG/DL
CK SERPL-CCNC: 76 U/L
CO2 SERPL-SCNC: 23 MMOL/L
COLOR: YELLOW
CREAT SERPL-MCNC: 1.12 MG/DL
EGFR: 73 ML/MIN/1.73M2
EOSINOPHIL # BLD AUTO: 0.13 K/UL
EOSINOPHIL NFR BLD AUTO: 1.5 %
EPITHELIAL CELLS: 0 /HPF
ESTIMATED AVERAGE GLUCOSE: 134 MG/DL
GLUCOSE QUALITATIVE U: NEGATIVE MG/DL
GLUCOSE SERPL-MCNC: 126 MG/DL
HBA1C MFR BLD HPLC: 6.3 %
HCT VFR BLD CALC: 45.6 %
HDLC SERPL-MCNC: 65 MG/DL
HGB BLD-MCNC: 15.2 G/DL
IMM GRANULOCYTES NFR BLD AUTO: 0.5 %
KETONES URINE: NEGATIVE MG/DL
LDLC SERPL CALC-MCNC: 60 MG/DL
LEUKOCYTE ESTERASE URINE: NEGATIVE
LYMPHOCYTES # BLD AUTO: 1.87 K/UL
LYMPHOCYTES NFR BLD AUTO: 22.2 %
MAN DIFF?: NORMAL
MCHC RBC-ENTMCNC: 29.7 PG
MCHC RBC-ENTMCNC: 33.3 GM/DL
MCV RBC AUTO: 89.1 FL
MICROSCOPIC-UA: NORMAL
MONOCYTES # BLD AUTO: 0.37 K/UL
MONOCYTES NFR BLD AUTO: 4.4 %
NEUTROPHILS # BLD AUTO: 5.97 K/UL
NEUTROPHILS NFR BLD AUTO: 70.8 %
NITRITE URINE: NEGATIVE
NONHDLC SERPL-MCNC: 81 MG/DL
PH URINE: 7
PLATELET # BLD AUTO: 312 K/UL
POTASSIUM SERPL-SCNC: 4.8 MMOL/L
PROT SERPL-MCNC: 7.3 G/DL
PROTEIN URINE: NEGATIVE MG/DL
RBC # BLD: 5.12 M/UL
RBC # FLD: 12.6 %
RED BLOOD CELLS URINE: 0 /HPF
SODIUM SERPL-SCNC: 142 MMOL/L
SPECIFIC GRAVITY URINE: 1.01
T4 FREE SERPL-MCNC: 0.9 NG/DL
TRIGL SERPL-MCNC: 118 MG/DL
TSH SERPL-ACNC: 1.8 UIU/ML
UROBILINOGEN URINE: 0.2 MG/DL
WBC # FLD AUTO: 8.43 K/UL
WHITE BLOOD CELLS URINE: 0 /HPF

## 2024-05-02 LAB
25(OH)D3 SERPL-MCNC: 27.2 NG/ML
ALBUMIN SERPL ELPH-MCNC: 4.4 G/DL
ALBUMIN SERPL ELPH-MCNC: 4.5 G/DL
ALBUMIN SERPL ELPH-MCNC: 4.7 G/DL
ALBUMIN SERPL ELPH-MCNC: 4.8 G/DL
ALP BLD-CCNC: 102 U/L
ALP BLD-CCNC: 104 U/L
ALP BLD-CCNC: 90 U/L
ALP BLD-CCNC: 97 U/L
ALT SERPL-CCNC: 14 U/L
ALT SERPL-CCNC: 31 U/L
ALT SERPL-CCNC: 32 U/L
ALT SERPL-CCNC: 34 U/L
ANION GAP SERPL CALC-SCNC: 13 MMOL/L
ANION GAP SERPL CALC-SCNC: 14 MMOL/L
ANION GAP SERPL CALC-SCNC: 14 MMOL/L
ANION GAP SERPL CALC-SCNC: 15 MMOL/L
ANION GAP SERPL CALC-SCNC: 17 MMOL/L
APPEARANCE: CLEAR
AST SERPL-CCNC: 17 U/L
AST SERPL-CCNC: 23 U/L
AST SERPL-CCNC: 25 U/L
AST SERPL-CCNC: 26 U/L
BACTERIA: NEGATIVE
BACTERIA: NEGATIVE
BACTERIA: NEGATIVE /HPF
BASOPHILS # BLD AUTO: 0.05 K/UL
BASOPHILS NFR BLD AUTO: 0.6 %
BASOPHILS NFR BLD AUTO: 0.6 %
BASOPHILS NFR BLD AUTO: 1.1 %
BILIRUB DIRECT SERPL-MCNC: 0.1 MG/DL
BILIRUB DIRECT SERPL-MCNC: 0.2 MG/DL
BILIRUB INDIRECT SERPL-MCNC: 0.1 MG/DL
BILIRUB INDIRECT SERPL-MCNC: 0.2 MG/DL
BILIRUB INDIRECT SERPL-MCNC: 0.3 MG/DL
BILIRUB INDIRECT SERPL-MCNC: 0.4 MG/DL
BILIRUB SERPL-MCNC: 0.2 MG/DL
BILIRUB SERPL-MCNC: 0.4 MG/DL
BILIRUB SERPL-MCNC: 0.4 MG/DL
BILIRUB SERPL-MCNC: 0.5 MG/DL
BILIRUBIN URINE: NEGATIVE
BLOOD URINE: NEGATIVE
BUN SERPL-MCNC: 13 MG/DL
BUN SERPL-MCNC: 14 MG/DL
BUN SERPL-MCNC: 14 MG/DL
BUN SERPL-MCNC: 15 MG/DL
BUN SERPL-MCNC: 16 MG/DL
CALCIUM SERPL-MCNC: 9.4 MG/DL
CALCIUM SERPL-MCNC: 9.5 MG/DL
CALCIUM SERPL-MCNC: 9.5 MG/DL
CALCIUM SERPL-MCNC: 9.6 MG/DL
CALCIUM SERPL-MCNC: 9.8 MG/DL
CAST: 0 /LPF
CHLORIDE SERPL-SCNC: 101 MMOL/L
CHLORIDE SERPL-SCNC: 103 MMOL/L
CHLORIDE SERPL-SCNC: 104 MMOL/L
CHLORIDE SERPL-SCNC: 105 MMOL/L
CHLORIDE SERPL-SCNC: 106 MMOL/L
CHOLEST SERPL-MCNC: 125 MG/DL
CHOLEST SERPL-MCNC: 132 MG/DL
CHOLEST SERPL-MCNC: 145 MG/DL
CHOLEST SERPL-MCNC: 157 MG/DL
CK SERPL-CCNC: 106 U/L
CK SERPL-CCNC: 117 U/L
CK SERPL-CCNC: 119 U/L
CK SERPL-CCNC: 99 U/L
CO2 SERPL-SCNC: 22 MMOL/L
CO2 SERPL-SCNC: 22 MMOL/L
CO2 SERPL-SCNC: 23 MMOL/L
CO2 SERPL-SCNC: 24 MMOL/L
CO2 SERPL-SCNC: 24 MMOL/L
COLOR: NORMAL
COLOR: NORMAL
COLOR: YELLOW
CREAT SERPL-MCNC: 1.02 MG/DL
CREAT SERPL-MCNC: 1.1 MG/DL
CREAT SERPL-MCNC: 1.1 MG/DL
CREAT SERPL-MCNC: 1.13 MG/DL
CREAT SERPL-MCNC: 1.17 MG/DL
EGFR: 70 ML/MIN/1.73M2
EGFR: 73 ML/MIN/1.73M2
EGFR: 74 ML/MIN/1.73M2
EGFR: 75 ML/MIN/1.73M2
EGFR: 82 ML/MIN/1.73M2
EOSINOPHIL # BLD AUTO: 0.12 K/UL
EOSINOPHIL # BLD AUTO: 0.18 K/UL
EOSINOPHIL # BLD AUTO: 0.24 K/UL
EOSINOPHIL NFR BLD AUTO: 2.3 %
EOSINOPHIL NFR BLD AUTO: 2.7 %
EOSINOPHIL NFR BLD AUTO: 2.8 %
EPITHELIAL CELLS: 1 /HPF
ESTIMATED AVERAGE GLUCOSE: 111 MG/DL
ESTIMATED AVERAGE GLUCOSE: 123 MG/DL
ESTIMATED AVERAGE GLUCOSE: 137 MG/DL
ESTIMATED AVERAGE GLUCOSE: 140 MG/DL
GLUCOSE QUALITATIVE U: NEGATIVE
GLUCOSE QUALITATIVE U: NEGATIVE
GLUCOSE QUALITATIVE U: NEGATIVE MG/DL
GLUCOSE SERPL-MCNC: 111 MG/DL
GLUCOSE SERPL-MCNC: 116 MG/DL
GLUCOSE SERPL-MCNC: 125 MG/DL
GLUCOSE SERPL-MCNC: 129 MG/DL
GLUCOSE SERPL-MCNC: 133 MG/DL
HBA1C MFR BLD HPLC: 5.5 %
HBA1C MFR BLD HPLC: 5.9 %
HBA1C MFR BLD HPLC: 6.4 %
HBA1C MFR BLD HPLC: 6.5 %
HCT VFR BLD CALC: 42.5 %
HCT VFR BLD CALC: 44.5 %
HCT VFR BLD CALC: 45 %
HDLC SERPL-MCNC: 59 MG/DL
HDLC SERPL-MCNC: 63 MG/DL
HDLC SERPL-MCNC: 66 MG/DL
HDLC SERPL-MCNC: 74 MG/DL
HGB BLD-MCNC: 13.7 G/DL
HGB BLD-MCNC: 15 G/DL
HGB BLD-MCNC: 15.2 G/DL
HYALINE CASTS: 0 /LPF
HYALINE CASTS: 0 /LPF
IMM GRANULOCYTES NFR BLD AUTO: 0.2 %
IMM GRANULOCYTES NFR BLD AUTO: 0.2 %
IMM GRANULOCYTES NFR BLD AUTO: 0.3 %
KETONES URINE: NEGATIVE
KETONES URINE: NEGATIVE
KETONES URINE: NEGATIVE MG/DL
LDLC SERPL CALC-MCNC: 53 MG/DL
LDLC SERPL CALC-MCNC: 54 MG/DL
LDLC SERPL CALC-MCNC: 64 MG/DL
LDLC SERPL CALC-MCNC: 68 MG/DL
LEUKOCYTE ESTERASE URINE: NEGATIVE
LYMPHOCYTES # BLD AUTO: 1.14 K/UL
LYMPHOCYTES # BLD AUTO: 2.33 K/UL
LYMPHOCYTES # BLD AUTO: 2.46 K/UL
LYMPHOCYTES NFR BLD AUTO: 25.9 %
LYMPHOCYTES NFR BLD AUTO: 29 %
LYMPHOCYTES NFR BLD AUTO: 30 %
MAGNESIUM SERPL-MCNC: 2.3 MG/DL
MAN DIFF?: NORMAL
MCHC RBC-ENTMCNC: 28.4 PG
MCHC RBC-ENTMCNC: 30.6 PG
MCHC RBC-ENTMCNC: 30.7 PG
MCHC RBC-ENTMCNC: 32.2 GM/DL
MCHC RBC-ENTMCNC: 33.7 GM/DL
MCHC RBC-ENTMCNC: 33.8 GM/DL
MCV RBC AUTO: 88.2 FL
MCV RBC AUTO: 90.5 FL
MCV RBC AUTO: 91 FL
METANEPHRINE, PL: <10 PG/ML
MICROSCOPIC-UA: NORMAL
MONOCYTES # BLD AUTO: 0.34 K/UL
MONOCYTES # BLD AUTO: 0.47 K/UL
MONOCYTES # BLD AUTO: 0.51 K/UL
MONOCYTES NFR BLD AUTO: 6 %
MONOCYTES NFR BLD AUTO: 6 %
MONOCYTES NFR BLD AUTO: 7.7 %
NEUTROPHILS # BLD AUTO: 2.75 K/UL
NEUTROPHILS # BLD AUTO: 4.72 K/UL
NEUTROPHILS # BLD AUTO: 5.19 K/UL
NEUTROPHILS NFR BLD AUTO: 60.8 %
NEUTROPHILS NFR BLD AUTO: 61.4 %
NEUTROPHILS NFR BLD AUTO: 62.4 %
NITRITE URINE: NEGATIVE
NONHDLC SERPL-MCNC: 66 MG/DL
NONHDLC SERPL-MCNC: 67 MG/DL
NONHDLC SERPL-MCNC: 83 MG/DL
NONHDLC SERPL-MCNC: 83 MG/DL
NORMETANEPHRINE, PL: 42.7 PG/ML
PH URINE: 5.5
PH URINE: 6
PH URINE: 6
PLATELET # BLD AUTO: 150 K/UL
PLATELET # BLD AUTO: 300 K/UL
PLATELET # BLD AUTO: 304 K/UL
POTASSIUM SERPL-SCNC: 3.9 MMOL/L
POTASSIUM SERPL-SCNC: 4.1 MMOL/L
POTASSIUM SERPL-SCNC: 4.2 MMOL/L
POTASSIUM SERPL-SCNC: 4.2 MMOL/L
POTASSIUM SERPL-SCNC: 4.4 MMOL/L
PROT SERPL-MCNC: 7.3 G/DL
PROT SERPL-MCNC: 7.3 G/DL
PROT SERPL-MCNC: 7.5 G/DL
PROT SERPL-MCNC: 7.7 G/DL
PROTEIN URINE: NEGATIVE MG/DL
PROTEIN URINE: NORMAL
PROTEIN URINE: NORMAL
PSA SERPL-MCNC: 0.58 NG/ML
RBC # BLD: 4.82 M/UL
RBC # BLD: 4.89 M/UL
RBC # BLD: 4.97 M/UL
RBC # FLD: 12.6 %
RBC # FLD: 12.6 %
RBC # FLD: 14.1 %
RED BLOOD CELLS URINE: 0 /HPF
RED BLOOD CELLS URINE: 1 /HPF
RED BLOOD CELLS URINE: 2 /HPF
RENIN ACTIVITY, PLASMA: 1.55 NG/ML/HR
SODIUM SERPL-SCNC: 139 MMOL/L
SODIUM SERPL-SCNC: 139 MMOL/L
SODIUM SERPL-SCNC: 141 MMOL/L
SODIUM SERPL-SCNC: 142 MMOL/L
SODIUM SERPL-SCNC: 145 MMOL/L
SPECIFIC GRAVITY URINE: 1.02
SPECIFIC GRAVITY URINE: 1.03
SPECIFIC GRAVITY URINE: 1.03
SQUAMOUS EPITHELIAL CELLS: 0 /HPF
SQUAMOUS EPITHELIAL CELLS: 0 /HPF
T4 FREE SERPL-MCNC: 1 NG/DL
T4 FREE SERPL-MCNC: 1 NG/DL
T4 FREE SERPL-MCNC: 1.1 NG/DL
TRIGL SERPL-MCNC: 59 MG/DL
TRIGL SERPL-MCNC: 69 MG/DL
TRIGL SERPL-MCNC: 73 MG/DL
TRIGL SERPL-MCNC: 93 MG/DL
TSH SERPL-ACNC: 1.66 UIU/ML
TSH SERPL-ACNC: 2.06 UIU/ML
TSH SERPL-ACNC: 2.07 UIU/ML
UROBILINOGEN URINE: 0.2 MG/DL
UROBILINOGEN URINE: NORMAL
UROBILINOGEN URINE: NORMAL
WBC # FLD AUTO: 4.41 K/UL
WBC # FLD AUTO: 7.77 K/UL
WBC # FLD AUTO: 8.47 K/UL
WHITE BLOOD CELLS URINE: 0 /HPF

## 2024-07-04 ENCOUNTER — RX RENEWAL (OUTPATIENT)
Age: 64
End: 2024-07-04

## 2024-07-09 ENCOUNTER — APPOINTMENT (OUTPATIENT)
Dept: CARDIOLOGY | Facility: CLINIC | Age: 64
End: 2024-07-09
Payer: MEDICARE

## 2024-07-09 ENCOUNTER — APPOINTMENT (OUTPATIENT)
Dept: PULMONOLOGY | Facility: CLINIC | Age: 64
End: 2024-07-09
Payer: MEDICARE

## 2024-07-09 VITALS
TEMPERATURE: 98 F | DIASTOLIC BLOOD PRESSURE: 70 MMHG | HEIGHT: 71 IN | WEIGHT: 207 LBS | OXYGEN SATURATION: 99 % | BODY MASS INDEX: 28.98 KG/M2 | HEART RATE: 66 BPM | SYSTOLIC BLOOD PRESSURE: 130 MMHG

## 2024-07-09 DIAGNOSIS — I44.1 ATRIOVENTRICULAR BLOCK, SECOND DEGREE: ICD-10-CM

## 2024-07-09 DIAGNOSIS — R06.00 DYSPNEA, UNSPECIFIED: ICD-10-CM

## 2024-07-09 DIAGNOSIS — I10 ESSENTIAL (PRIMARY) HYPERTENSION: ICD-10-CM

## 2024-07-09 DIAGNOSIS — F41.9 ANXIETY DISORDER, UNSPECIFIED: ICD-10-CM

## 2024-07-09 DIAGNOSIS — R73.03 PREDIABETES.: ICD-10-CM

## 2024-07-09 DIAGNOSIS — Z12.83 ENCOUNTER FOR SCREENING FOR MALIGNANT NEOPLASM OF SKIN: ICD-10-CM

## 2024-07-09 DIAGNOSIS — E55.9 VITAMIN D DEFICIENCY, UNSPECIFIED: ICD-10-CM

## 2024-07-09 DIAGNOSIS — E78.5 HYPERLIPIDEMIA, UNSPECIFIED: ICD-10-CM

## 2024-07-09 DIAGNOSIS — Z12.5 ENCOUNTER FOR SCREENING FOR MALIGNANT NEOPLASM OF PROSTATE: ICD-10-CM

## 2024-07-09 DIAGNOSIS — M85.60 OTHER CYST OF BONE, UNSPECIFIED SITE: ICD-10-CM

## 2024-07-09 DIAGNOSIS — Z13.228 ENCOUNTER FOR SCREENING FOR OTHER METABOLIC DISORDERS: ICD-10-CM

## 2024-07-09 DIAGNOSIS — R07.89 OTHER CHEST PAIN: ICD-10-CM

## 2024-07-09 DIAGNOSIS — F32.A ANXIETY DISORDER, UNSPECIFIED: ICD-10-CM

## 2024-07-09 DIAGNOSIS — Z12.11 ENCOUNTER FOR SCREENING FOR MALIGNANT NEOPLASM OF COLON: ICD-10-CM

## 2024-07-09 DIAGNOSIS — G47.33 OBSTRUCTIVE SLEEP APNEA (ADULT) (PEDIATRIC): ICD-10-CM

## 2024-07-09 DIAGNOSIS — I25.10 ATHEROSCLEROTIC HEART DISEASE OF NATIVE CORONARY ARTERY W/OUT ANGINA PECTORIS: ICD-10-CM

## 2024-07-09 DIAGNOSIS — R10.9 UNSPECIFIED ABDOMINAL PAIN: ICD-10-CM

## 2024-07-09 DIAGNOSIS — Z00.00 ENCOUNTER FOR GENERAL ADULT MEDICAL EXAMINATION W/OUT ABNORMAL FINDINGS: ICD-10-CM

## 2024-07-09 PROCEDURE — G2211 COMPLEX E/M VISIT ADD ON: CPT

## 2024-07-09 PROCEDURE — 71046 X-RAY EXAM CHEST 2 VIEWS: CPT

## 2024-07-09 PROCEDURE — 99214 OFFICE O/P EST MOD 30 MIN: CPT

## 2024-07-09 PROCEDURE — 93000 ELECTROCARDIOGRAM COMPLETE: CPT

## 2024-07-12 ENCOUNTER — NON-APPOINTMENT (OUTPATIENT)
Age: 64
End: 2024-07-12

## 2024-07-12 LAB
25(OH)D3 SERPL-MCNC: 32.4 NG/ML
ALBUMIN SERPL ELPH-MCNC: 4.9 G/DL
ALP BLD-CCNC: 114 U/L
ALT SERPL-CCNC: 33 U/L
ANION GAP SERPL CALC-SCNC: 18 MMOL/L
APPEARANCE: CLEAR
AST SERPL-CCNC: 26 U/L
BACTERIA: NEGATIVE /HPF
BASOPHILS # BLD AUTO: 0.05 K/UL
BASOPHILS NFR BLD AUTO: 0.5 %
BILIRUB DIRECT SERPL-MCNC: 0.2 MG/DL
BILIRUB INDIRECT SERPL-MCNC: 0.3 MG/DL
BILIRUB SERPL-MCNC: 0.5 MG/DL
BILIRUBIN URINE: NEGATIVE
BLOOD URINE: NEGATIVE
CALCIUM SERPL-MCNC: 9.9 MG/DL
CAST: 0 /LPF
CHLORIDE SERPL-SCNC: 104 MMOL/L
CK SERPL-CCNC: 87 U/L
CO2 SERPL-SCNC: 21 MMOL/L
COLOR: YELLOW
CREAT SERPL-MCNC: 1.05 MG/DL
CREAT SPEC-SCNC: 63 MG/DL
EGFR: 78 ML/MIN/1.73M2
EOSINOPHIL NFR BLD AUTO: 2.2 %
ESTIMATED AVERAGE GLUCOSE: 146 MG/DL
FOLATE SERPL-MCNC: 11.1 NG/ML
GLUCOSE QUALITATIVE U: NEGATIVE MG/DL
GLUCOSE SERPL-MCNC: 103 MG/DL
HBA1C MFR BLD HPLC: 6.7 %
HCT VFR BLD CALC: 44.4 %
HGB BLD-MCNC: 14.6 G/DL
IMM GRANULOCYTES NFR BLD AUTO: 0.4 %
KETONES URINE: NEGATIVE MG/DL
LDLC SERPL CALC-MCNC: 71 MG/DL
LEUKOCYTE ESTERASE URINE: NEGATIVE
LYMPHOCYTES # BLD AUTO: 2.11 K/UL
MAGNESIUM SERPL-MCNC: 2.2 MG/DL
MAN DIFF?: NORMAL
MCHC RBC-ENTMCNC: 32.9 GM/DL
MCV RBC AUTO: 92.3 FL
MICROALBUMIN 24H UR DL<=1MG/L-MCNC: <1.2 MG/DL
MICROSCOPIC-UA: NORMAL
MONOCYTES # BLD AUTO: 0.41 K/UL
MONOCYTES NFR BLD AUTO: 4.1 %
NEUTROPHILS # BLD AUTO: 7.22 K/UL
NEUTROPHILS NFR BLD AUTO: 71.8 %
NITRITE URINE: NEGATIVE
NONHDLC SERPL-MCNC: 92 MG/DL
PH URINE: 7.5
PLATELET # BLD AUTO: 327 K/UL
PROT SERPL-MCNC: 7.4 G/DL
PSA SERPL-MCNC: 0.56 NG/ML
RBC # BLD: 4.81 M/UL
RBC # FLD: 13.2 %
RED BLOOD CELLS URINE: 0 /HPF
SPECIFIC GRAVITY URINE: 1.01
T4 FREE SERPL-MCNC: 1 NG/DL
TRIGL SERPL-MCNC: 117 MG/DL
TSH SERPL-ACNC: 1.39 UIU/ML
UROBILINOGEN URINE: 0.2 MG/DL
VIT B12 SERPL-MCNC: 809 PG/ML
WBC # FLD AUTO: 10.05 K/UL
WHITE BLOOD CELLS URINE: 0 /HPF

## 2024-07-29 ENCOUNTER — APPOINTMENT (OUTPATIENT)
Dept: GASTROENTEROLOGY | Facility: CLINIC | Age: 64
End: 2024-07-29
Payer: MEDICARE

## 2024-07-29 VITALS
RESPIRATION RATE: 17 BRPM | WEIGHT: 210 LBS | DIASTOLIC BLOOD PRESSURE: 85 MMHG | OXYGEN SATURATION: 99 % | HEART RATE: 69 BPM | HEIGHT: 71 IN | BODY MASS INDEX: 29.4 KG/M2 | TEMPERATURE: 97.6 F | SYSTOLIC BLOOD PRESSURE: 138 MMHG

## 2024-07-29 DIAGNOSIS — K58.9 IRRITABLE BOWEL SYNDROME W/OUT DIARRHEA: ICD-10-CM

## 2024-07-29 DIAGNOSIS — Z86.010 PERSONAL HISTORY OF COLONIC POLYPS: ICD-10-CM

## 2024-07-29 PROCEDURE — 99213 OFFICE O/P EST LOW 20 MIN: CPT

## 2024-07-29 RX ORDER — HYOSCYAMINE SULFATE 0.38 MG/1
0.38 TABLET, EXTENDED RELEASE ORAL
Qty: 60 | Refills: 4 | Status: ACTIVE | COMMUNITY
Start: 2024-07-29 | End: 1900-01-01

## 2024-07-29 NOTE — ASSESSMENT
[FreeTextEntry1] : Continue hyoscyamine as needed Repeat colonoscopy in 2 years  Office follow-up in 1 year   I spent 24 minutes with the patient and answered all of his questions

## 2024-07-29 NOTE — CONSULT LETTER
[Dear  ___] : Dear  [unfilled], [Consult Letter:] : I had the pleasure of evaluating your patient, [unfilled]. [( Thank you for referring [unfilled] for consultation for _____ )] : Thank you for referring [unfilled] for consultation for [unfilled] [Please see my note below.] : Please see my note below. [Consult Closing:] : Thank you very much for allowing me to participate in the care of this patient.  If you have any questions, please do not hesitate to contact me. [Sincerely,] : Sincerely, [FreeTextEntry3] : Denis Kne MD  Gastroenterology [facility]

## 2024-07-29 NOTE — HISTORY OF PRESENT ILLNESS
[FreeTextEntry1] : He admits to occasional episodes of abdominal pain which is relieved with hyoscyamine.  He is running low on his hyoscyamine and  needs a refill.  Last colonoscopy was October 2019 in which  a small polyp was removed but the rest the colon was normal.  He is presently taking doxycycline  100 mg twice a day for a tick bite with a rash which is most likely Lyme's disease.

## 2024-12-06 NOTE — ASU PREOP CHECKLIST - WAS PATIENT ON BETA BLOCKER?
Diagnosis: MM  Regimen: Pomalyst     Oral Chemo: Pomalyst (started C1 1/13/22)      How is patient taking medication: pt takes 2mg daily D1-21, q28      Address next refill: no issues last dispensed 11/29/2024-[facility]     Notes: NN met with Autumn in the treatment area, she is doing well on Pomalyst. She recently had issues with her hip and used her husbands cane, she now has redness and swelling in the right wrist. (She felt the cane was too tall and she was straining it). She is getting an xray and uric acid level drawn. If both negative will start steroids. Will continue to follow up. She wont meet requirements for BMS and will pay the 2000$ over the 12 months when needed. Will continue to follow up.   Vital Signs:   ONC OP Encounter Vitals  BP: 126/76  Heart Rate: 69  Resp: 16  Temp: 96.8 °F (36 °C)  Temp src: Temporal  SpO2: 100 %  Weight: (!) 43.5 kg (96 lb)  Height: 5' (1.524 m)  Pain Score:  0  BSA (Calculated - m2) - [employer]: 1.37  BSA (Calculated - sq m): 1.36  BMI (Calculated): 18.75      Allergies:    ALLERGIES:   Allergen Reactions    Ciprofloxacin NAUSEA    Metronidazole NAUSEA        ECOG:    Oral Chemo Follow-Up:  Oral Chemo Medication #1  Oral Chemo Medication 1: Pomalidomide (POMALYST)  Oral Chemo Medication Dose & Frequency: 2mg d1-21 q28  Oral Chemo Current Cycle # or Maintenance: Maintenance  Oral Chemo Medication Status: Actively Taking  Patient is currently adherent with oral chemotherapy?: Yes  Additional Oral Chemo Medication?: No      Oral Chemo Assessment:        Distress Screening: Is this day one of cycle or a new regimen? No No, patient did not indicate any new concerns. Refer to most recent PHQ2/9 score    Toxicity Assessment:   Adverse Events?  Adverse Events: No    Auditory/Ear  Assessment: Yes (Within Defined Limits)    Cardiac General  Assessment: Yes (Within Defined Limits)    Dermatology/Skin  Assessment: Yes (Within Defined Limits)    Constitutional  Assessment: Yes (Within  Defined Limits)    Endocrine  Assessment: Yes (Within Defined Limits)    Gastrointestinal  Assessment: Yes (Within Defined Limits)    Hemorrhage/Bleeding  Assessment: Yes (Within Defined Limits)    Infection  Assessment: Yes (Within Defined Limits)    Lymphatics  Assessment: Yes (Within Defined Limits)    Musculoskeletal  Assessment: Yes (Within Defined Limits)    Neurology  Assessment: Yes (Within Defined Limits)    Ocular  Assessment: Yes (Within Defined Limits)    Pain  Assessment: Yes (w/ Exceptions to WDL)  Pain: Grade 2 (pain right wrist)    Pulmonary/Upper Respiratory  Assessment: Yes (Within Defined Limits)    Genitourinary  Assessment: Yes (Within Defined Limits)      Additional Nursing Assessment: In addition to above toxicity assessment, this RN assessed patient compliance .       Education provided No new instructions needed    Patient instructed to call the office with any questions or concerns.    No

## 2024-12-21 ENCOUNTER — INPATIENT (INPATIENT)
Facility: HOSPITAL | Age: 64
LOS: 1 days | Discharge: HOME | DRG: 544 | End: 2024-12-23
Attending: ORTHOPAEDIC SURGERY | Admitting: ORTHOPAEDIC SURGERY
Payer: MEDICARE

## 2024-12-21 VITALS — WEIGHT: 207.01 LBS | HEIGHT: 71 IN

## 2024-12-21 DIAGNOSIS — Z98.890 OTHER SPECIFIED POSTPROCEDURAL STATES: Chronic | ICD-10-CM

## 2024-12-21 DIAGNOSIS — M84.451A PATHOLOGICAL FRACTURE, RIGHT FEMUR, INITIAL ENCOUNTER FOR FRACTURE: ICD-10-CM

## 2024-12-21 LAB
ALBUMIN SERPL ELPH-MCNC: 4.4 G/DL — SIGNIFICANT CHANGE UP (ref 3.3–5)
ALP SERPL-CCNC: 114 U/L — SIGNIFICANT CHANGE UP (ref 40–120)
ALT FLD-CCNC: 23 U/L — SIGNIFICANT CHANGE UP (ref 10–45)
ANION GAP SERPL CALC-SCNC: 14 MMOL/L — SIGNIFICANT CHANGE UP (ref 5–17)
AST SERPL-CCNC: 23 U/L — SIGNIFICANT CHANGE UP (ref 10–40)
BASOPHILS # BLD AUTO: 0.06 K/UL — SIGNIFICANT CHANGE UP (ref 0–0.2)
BASOPHILS NFR BLD AUTO: 0.5 % — SIGNIFICANT CHANGE UP (ref 0–2)
BILIRUB SERPL-MCNC: 0.5 MG/DL — SIGNIFICANT CHANGE UP (ref 0.2–1.2)
BUN SERPL-MCNC: 11 MG/DL — SIGNIFICANT CHANGE UP (ref 7–23)
CA-I BLD-SCNC: 1.15 MMOL/L — SIGNIFICANT CHANGE UP (ref 1.15–1.33)
CALCIUM SERPL-MCNC: 9.4 MG/DL — SIGNIFICANT CHANGE UP (ref 8.4–10.5)
CHLORIDE SERPL-SCNC: 105 MMOL/L — SIGNIFICANT CHANGE UP (ref 96–108)
CO2 SERPL-SCNC: 20 MMOL/L — LOW (ref 22–31)
CREAT SERPL-MCNC: 0.89 MG/DL — SIGNIFICANT CHANGE UP (ref 0.5–1.3)
CRP SERPL-MCNC: 19 MG/L — HIGH (ref 0–4)
EGFR: 95 ML/MIN/1.73M2 — SIGNIFICANT CHANGE UP
EOSINOPHIL # BLD AUTO: 0.18 K/UL — SIGNIFICANT CHANGE UP (ref 0–0.5)
EOSINOPHIL NFR BLD AUTO: 1.6 % — SIGNIFICANT CHANGE UP (ref 0–6)
GLUCOSE SERPL-MCNC: 124 MG/DL — HIGH (ref 70–99)
HCT VFR BLD CALC: 42.5 % — SIGNIFICANT CHANGE UP (ref 39–50)
HGB BLD-MCNC: 14.5 G/DL — SIGNIFICANT CHANGE UP (ref 13–17)
IMM GRANULOCYTES NFR BLD AUTO: 0.3 % — SIGNIFICANT CHANGE UP (ref 0–0.9)
LDH SERPL L TO P-CCNC: 292 U/L — HIGH (ref 50–242)
LYMPHOCYTES # BLD AUTO: 19.2 % — SIGNIFICANT CHANGE UP (ref 13–44)
LYMPHOCYTES # BLD AUTO: 2.17 K/UL — SIGNIFICANT CHANGE UP (ref 1–3.3)
MAGNESIUM SERPL-MCNC: 2.1 MG/DL — SIGNIFICANT CHANGE UP (ref 1.6–2.6)
MCHC RBC-ENTMCNC: 30.7 PG — SIGNIFICANT CHANGE UP (ref 27–34)
MCHC RBC-ENTMCNC: 34.1 G/DL — SIGNIFICANT CHANGE UP (ref 32–36)
MCV RBC AUTO: 90 FL — SIGNIFICANT CHANGE UP (ref 80–100)
MONOCYTES # BLD AUTO: 0.66 K/UL — SIGNIFICANT CHANGE UP (ref 0–0.9)
MONOCYTES NFR BLD AUTO: 5.8 % — SIGNIFICANT CHANGE UP (ref 2–14)
NEUTROPHILS # BLD AUTO: 8.19 K/UL — HIGH (ref 1.8–7.4)
NEUTROPHILS NFR BLD AUTO: 72.6 % — SIGNIFICANT CHANGE UP (ref 43–77)
NRBC # BLD: 0 /100 WBCS — SIGNIFICANT CHANGE UP (ref 0–0)
PHOSPHATE SERPL-MCNC: 3.2 MG/DL — SIGNIFICANT CHANGE UP (ref 2.5–4.5)
PLATELET # BLD AUTO: 319 K/UL — SIGNIFICANT CHANGE UP (ref 150–400)
POTASSIUM SERPL-MCNC: 4.2 MMOL/L — SIGNIFICANT CHANGE UP (ref 3.5–5.3)
POTASSIUM SERPL-SCNC: 4.2 MMOL/L — SIGNIFICANT CHANGE UP (ref 3.5–5.3)
PROT SERPL-MCNC: 7.7 G/DL — SIGNIFICANT CHANGE UP (ref 6–8.3)
RBC # BLD: 4.72 M/UL — SIGNIFICANT CHANGE UP (ref 4.2–5.8)
RBC # FLD: 12.2 % — SIGNIFICANT CHANGE UP (ref 10.3–14.5)
SODIUM SERPL-SCNC: 139 MMOL/L — SIGNIFICANT CHANGE UP (ref 135–145)
WBC # BLD: 11.29 K/UL — HIGH (ref 3.8–10.5)
WBC # FLD AUTO: 11.29 K/UL — HIGH (ref 3.8–10.5)

## 2024-12-21 PROCEDURE — 73552 X-RAY EXAM OF FEMUR 2/>: CPT | Mod: 26,RT

## 2024-12-21 PROCEDURE — 72192 CT PELVIS W/O DYE: CPT | Mod: 26,MC

## 2024-12-21 PROCEDURE — 76377 3D RENDER W/INTRP POSTPROCES: CPT | Mod: 26

## 2024-12-21 PROCEDURE — 71250 CT THORAX DX C-: CPT | Mod: 26,MC

## 2024-12-21 PROCEDURE — 73502 X-RAY EXAM HIP UNI 2-3 VIEWS: CPT | Mod: 26,RT

## 2024-12-21 PROCEDURE — 73700 CT LOWER EXTREMITY W/O DYE: CPT | Mod: 26,50,MC

## 2024-12-21 PROCEDURE — 71046 X-RAY EXAM CHEST 2 VIEWS: CPT | Mod: 26

## 2024-12-21 PROCEDURE — 99222 1ST HOSP IP/OBS MODERATE 55: CPT

## 2024-12-21 PROCEDURE — 72170 X-RAY EXAM OF PELVIS: CPT | Mod: 26,XE

## 2024-12-21 PROCEDURE — 99285 EMERGENCY DEPT VISIT HI MDM: CPT | Mod: GC

## 2024-12-21 RX ORDER — ENOXAPARIN SODIUM 60 MG/.6ML
40 INJECTION INTRAVENOUS; SUBCUTANEOUS EVERY 24 HOURS
Refills: 0 | Status: DISCONTINUED | OUTPATIENT
Start: 2024-12-21 | End: 2024-12-23

## 2024-12-21 RX ORDER — ATORVASTATIN CALCIUM 40 MG/1
40 TABLET, FILM COATED ORAL AT BEDTIME
Refills: 0 | Status: DISCONTINUED | OUTPATIENT
Start: 2024-12-21 | End: 2024-12-23

## 2024-12-21 RX ORDER — MAGNESIUM HYDROXIDE 400 MG/5ML
30 SUSPENSION, ORAL (FINAL DOSE FORM) ORAL DAILY
Refills: 0 | Status: DISCONTINUED | OUTPATIENT
Start: 2024-12-21 | End: 2024-12-23

## 2024-12-21 RX ORDER — SENNOSIDES 8.6 MG/1
2 TABLET, FILM COATED ORAL AT BEDTIME
Refills: 0 | Status: DISCONTINUED | OUTPATIENT
Start: 2024-12-21 | End: 2024-12-23

## 2024-12-21 RX ORDER — GINKGO BILOBA 40 MG
3 CAPSULE ORAL AT BEDTIME
Refills: 0 | Status: DISCONTINUED | OUTPATIENT
Start: 2024-12-21 | End: 2024-12-23

## 2024-12-21 RX ORDER — ACETAMINOPHEN 80 MG/.8ML
975 SOLUTION/ DROPS ORAL EVERY 8 HOURS
Refills: 0 | Status: DISCONTINUED | OUTPATIENT
Start: 2024-12-21 | End: 2024-12-23

## 2024-12-21 RX ORDER — HYOSCYAMINE SULFATE 0.375 MG
0.38 TABLET, EXTENDED RELEASE 12 HR ORAL EVERY 12 HOURS
Refills: 0 | Status: DISCONTINUED | OUTPATIENT
Start: 2024-12-21 | End: 2024-12-22

## 2024-12-21 RX ORDER — OXYCODONE HCL 15 MG
5 TABLET ORAL EVERY 4 HOURS
Refills: 0 | Status: DISCONTINUED | OUTPATIENT
Start: 2024-12-21 | End: 2024-12-23

## 2024-12-21 RX ORDER — MIRTAZAPINE 7.5 MG/1
7.5 TABLET, FILM COATED ORAL AT BEDTIME
Refills: 0 | Status: DISCONTINUED | OUTPATIENT
Start: 2024-12-21 | End: 2024-12-23

## 2024-12-21 RX ORDER — OXYCODONE HCL 15 MG
2.5 TABLET ORAL EVERY 4 HOURS
Refills: 0 | Status: DISCONTINUED | OUTPATIENT
Start: 2024-12-21 | End: 2024-12-23

## 2024-12-21 RX ORDER — LOSARTAN POTASSIUM 100 MG/1
50 TABLET, FILM COATED ORAL DAILY
Refills: 0 | Status: DISCONTINUED | OUTPATIENT
Start: 2024-12-21 | End: 2024-12-22

## 2024-12-21 RX ORDER — ASPIRIN 81 MG
81 TABLET, DELAYED RELEASE (ENTERIC COATED) ORAL DAILY
Refills: 0 | Status: DISCONTINUED | OUTPATIENT
Start: 2024-12-21 | End: 2024-12-23

## 2024-12-21 NOTE — ED PROVIDER NOTE - OBJECTIVE STATEMENT
64-year-old male with past medical history of hypertension and hyperlipidemia presents to the ED after a fall yesterday.  Patient states that he was walking around when cused him to fall and hit his hip and rib into the cabinets.  Reports that he has been walking since and feels okay.  Reports going to urgent care and get an x-ray done for his right hip and femur due to persisting pain.  Noted that they found an avulsion fracture of the greater trochanter and was recommended to come to the emergency department.  Reports that he feels okay otherwise and does not need any pain medication.  Denies any fevers, chills, numbness, tingling, loss of strength, loss of sensation.

## 2024-12-21 NOTE — ED PROVIDER NOTE - ATTENDING CONTRIBUTION TO CARE
I have personally performed a face to face medical and diagnostic evaluation of the patient. I have discussed with and reviewed the Resident's and/or ACP's and/or Medical/PA/NP student's note and agree with the History, ROS, Physical Exam and MDM unless otherwise indicated. A brief summary of my personal evaluation and impression can be found below.     64-year-old male past medical history hypertension, hyperlipidemia, takes baby aspirin daily presents to the emergency department from urgent care for concern for right greater trochanteric avulsion fracture status post mechanical fall yesterday, no head trauma, no LOC, patient states he tripped over a cat toy landing onto his right side, patient reporting mild right lateral hip pain that is nonradiating, no associated paresthesias or weakness.  Patient ambulating without issue.  Patient also reports mild right lateral chest wall pain.  No headache, blurred vision, nausea, vomiting, dizziness.  No back pain.  Physical exam shows patient well-appearing, full range of motion in all extremities, distal pulses intact, no pitting edema lower extremities.  Strength and sensation intact.  No focal deficits.  Pupils equal round reactive to light, extraocular movements intact.  Regular rate and rhythm, lungs clear to auscultation.  Mild tenderness to palpation at the right lateral chest wall.  No discrete bony tenderness aside from ttp at R lateral thigh.  Given history and physical differential includes but is not limited to fracture, contusion.  Low concern for ICH (patient without any head trauma or LOC, fell onto his right hip and chest, no neurodeficits, pupils equal round reactive to light, no signs of trauma to the head).  Plan for x-ray, CT, reassess.  Patient declining pain meds at this time.

## 2024-12-21 NOTE — ED ADULT NURSE NOTE - NSFALLHARMRISKINTERV_ED_ALL_ED
Assistance OOB with selected safe patient handling equipment if applicable/Communicate risk of Fall with Harm to all staff, patient, and family/Provide visual cue: red socks, yellow wristband, yellow gown, etc/Reinforce activity limits and safety measures with patient and family/Bed in lowest position, wheels locked, appropriate side rails in place/Call bell, personal items and telephone in reach/Instruct patient to call for assistance before getting out of bed/chair/stretcher/Non-slip footwear applied when patient is off stretcher/[place] to call system/Physically safe environment - no spills, clutter or unnecessary equipment/Purposeful Proactive Rounding/Room/bathroom lighting operational, light cord in reach

## 2024-12-21 NOTE — H&P ADULT - HISTORY OF PRESENT ILLNESS
Orthopaedic Surgery H+P    Patient is a 64y old  Male PMH HTN and HLD who presents with a chief complaint of R hip pain. Yesterday he tripped and fell over a cat toy directly onto his R hip/side. He denies HS/LOC. He denies previous pain in the hip prior to the injury. He has no cancer history. Denies recent infection, fevers or chills.         PAST MEDICAL & SURGICAL HISTORY:  Lactose intolerance      Gluten intolerance      IBS (irritable bowel syndrome)      Anxiety      Hypertension  March 2022      Insomnia  secondary to anxiety      Diverticulosis      Hiatal hernia  no GERD      Hyperlipidemia      Elevated coronary artery calcium score  485 April 2022, follow up stress test "normal"      Umbilical hernia      Bacterial gastroenteritis  2007      COVID-19 vaccine series completed      COVID-19  March 2020, flu-like symptoms. Never was tested but was directly exposed to covid      Osteoarthritis      Dislocation of right thumb  chronic      Fatty liver      Tinnitus, left ear      Hearing loss, left      White coat syndrome with hypertension      History of thumb surgery  right thumb 1985 removal of bone fragments and cartilege repair and 2015 reattachement of right thumb after traumatic amutation      Status post LASIK surgery of both eyes  2002        [] No significant past history as reviewed with the patient and family    MEDICATIONS  (STANDING):    MEDICATIONS  (PRN):    Allergies    sulfa drugs (Hives; Urticaria)    Intolerances    lactose (Flatulence; Stomach Upset)  Gluten (Flatulence; Stomach Upset)      Vital Signs Last 24 Hrs  T(C): 36.8 (21 Dec 2024 20:56), Max: 36.8 (21 Dec 2024 20:56)  T(F): 98.3 (21 Dec 2024 20:56), Max: 98.3 (21 Dec 2024 20:56)  HR: 75 (21 Dec 2024 20:56) (75 - 88)  BP: 160/92 (21 Dec 2024 20:56) (160/92 - 171/76)  BP(mean): --  RR: 16 (21 Dec 2024 20:56) (15 - 18)  SpO2: 100% (21 Dec 2024 20:56) (99% - 100%)    Parameters below as of 21 Dec 2024 20:56  Patient On (Oxygen Delivery Method): room air          PHYSICAL EXAM:  R Lower Extremity:  Skin intact   SILT S/S/DP/SP/T  +EHL/FHL/TA/GSC  Compartments soft/non-tender  Toes warm, Cap refill brisk/warm and perfused, +DP/PT pulse       Secondary: B/l upper extremities and LLE without bony tenderness or pain with ROM.                          14.5   11.29 )-----------( 319      ( 21 Dec 2024 19:45 )             42.5     12-21    139  |  105  |  11  ----------------------------<  124[H]  4.2   |  20[L]  |  0.89    Ca    9.4      21 Dec 2024 19:45  Phos  3.2     12-21  Mg     2.1     12-21    TPro  7.7  /  Alb  4.4  /  TBili  0.5  /  DBili  x   /  AST  23  /  ALT  23  /  AlkPhos  114  12-21      Urinalysis Basic - ( 21 Dec 2024 19:45 )    Color: x / Appearance: x / SG: x / pH: x  Gluc: 124 mg/dL / Ketone: x  / Bili: x / Urobili: x   Blood: x / Protein: x / Nitrite: x   Leuk Esterase: x / RBC: x / WBC x   Sq Epi: x / Non Sq Epi: x / Bacteria: x        ASSESSMENT  64y Male with pathologic fracture of R proximal femur. XR and CT demonstrate lytic lesion with bubbly appearance. DDx includes fibrous dysplasia, LSMFT, simple bone cyst, mets, lymphoma, etc.    PLAN  - NWB RLE  - Pain control  - Will obtain following labs: CBC+diff, CMP, Mg/Phos, LDH, ALP, Ca+ionized Ca, ESR/CRP, TSH, CEA, PSA, serum protein eletrophoresis, urine protein electrophoresis  - MRI R hip w/w/o IV contrast  - Results of MR will determine plan for surgical intervention      Discussed with attending orthopaedic oncologist, Dr. Fonseca.

## 2024-12-21 NOTE — ED PROVIDER NOTE - CLINICAL SUMMARY MEDICAL DECISION MAKING FREE TEXT BOX
53-year-old male with past medical history of hypertension hyperlipidemia presents the ED after a fall.  Reports going to urgent care today and getting an x-ray that showed right greater trochanteric avulsion fracture.  Currently reports minimal pain.  Patient is able to walk and fully range his right hip and leg.  Mild pain with internal rotation of the hip.    Will obtain x-rays of the hip pelvis and femur to assess for fractures.  Will treat with pain medication if necessary.

## 2024-12-21 NOTE — ED ADULT NURSE NOTE - OBJECTIVE STATEMENT
65 yo male with a PMH of OA, HLD, diverticulosis, anxiety, HTN, IBS, insomnia presents to the ED from urgent care complaining of R leg pain. Patient had a mechanical fall yesterday at 1100. Denies LOC, head injury. On low dose ASA. Reports he fell on his R side. Was ambulating all day with no limitations to ADLs. Today went to  and was dx with a R femur fracture. Patient well appearing, does not want anything for pain at this time. Denies headache, dizziness, vision changes, chest pain, shortness of breath, abdominal pain, nausea, vomiting, diarrhea, fevers, chills, dysuria, hematuria, recent illness travel.

## 2024-12-21 NOTE — ED PROVIDER NOTE - PROGRESS NOTE DETAILS
Pending ortho recs.  Vern Mixon PGY-3 Received signout, ortho to evaluated patient.  Vern Mixon PGY-3 TBA to ashish Mixon PGY-3

## 2024-12-21 NOTE — ED PROVIDER NOTE - PRO INTERPRETER NEED 2
[facility]  Phone: [phone] Fax: [phone]       Physical Therapy Daily Treatment Note  Date:  2020    Patient Name:  Karla Collado    :  2002  MRN: [id number]    Krystal Garcia

## 2024-12-21 NOTE — H&P ADULT - ATTENDING COMMENTS
Agree with above. 64M w/ hx of htn, hld, had a mech fall directly on his R hip. Was able to walk after the fall. Imaging revealed a nondisplaced R GT fx. Subsequent MRI did not show any extension into the IT area. Imaging also incidentally revealing a right proximal femur benign appearing lesion, possibly FD vs. LSMFT vs. intraosseous lipoma. Similar findings were present on a prior CT abdomen (2022). On exam, patient able to walk well without a limp without assistance. He says the pain is very minimal. I rec'd WBAT RLE, crutches for comfort if needed, abductor precautions. FU in the office in 2 weeks. No surgical intervention recommended at this time.

## 2024-12-21 NOTE — ED PROVIDER NOTE - PHYSICAL EXAMINATION
Yuliet Hernandez DO (PGY1)   Physical Exam:    Gen: NAD, AOx3  Head: NCAT  HEENT: EOMI, PEERLA  Lung: CTAB, no respiratory distress, no wheezes/rhonchi/rales B/L  CV: RRR, no murmurs, rubs or gallops  Abd: soft, NT, ND, no guarding, no rigidity, no rebound tenderness, no CVA tenderness   MSK: no visible deformities, ROM normal in UE/LE, no back pain, right hip pain with internal rotation  Neuro: No focal sensory or motor deficits. Sensation intact to light touch all extremities.  Skin: Warm, well perfused, no rash, no leg swelling  Psych: normal affect, calm

## 2024-12-22 LAB
CEA SERPL-MCNC: 3.3 NG/ML — SIGNIFICANT CHANGE UP (ref 0–3.8)
ERYTHROCYTE [SEDIMENTATION RATE] IN BLOOD: 11 MM/HR — SIGNIFICANT CHANGE UP (ref 0–20)
PROT SERPL-MCNC: 7.4 G/DL — SIGNIFICANT CHANGE UP (ref 6–8.3)
PSA FLD-MCNC: 0.55 NG/ML — SIGNIFICANT CHANGE UP (ref 0–4)
TSH SERPL-MCNC: 1.84 UIU/ML — SIGNIFICANT CHANGE UP (ref 0.27–4.2)

## 2024-12-22 PROCEDURE — 73502 X-RAY EXAM HIP UNI 2-3 VIEWS: CPT | Mod: 26,LT

## 2024-12-22 PROCEDURE — 73723 MRI JOINT LWR EXTR W/O&W/DYE: CPT | Mod: 26,RT

## 2024-12-22 PROCEDURE — 73552 X-RAY EXAM OF FEMUR 2/>: CPT | Mod: 26,LT

## 2024-12-22 RX ORDER — HYOSCYAMINE SULFATE 0.375 MG
0.25 TABLET, EXTENDED RELEASE 12 HR ORAL EVERY 8 HOURS
Refills: 0 | Status: DISCONTINUED | OUTPATIENT
Start: 2024-12-22 | End: 2024-12-23

## 2024-12-22 RX ORDER — LOSARTAN POTASSIUM 100 MG/1
50 TABLET, FILM COATED ORAL
Refills: 0 | Status: DISCONTINUED | OUTPATIENT
Start: 2024-12-22 | End: 2024-12-23

## 2024-12-22 RX ADMIN — ACETAMINOPHEN 975 MILLIGRAM(S): 80 SOLUTION/ DROPS ORAL at 13:04

## 2024-12-22 RX ADMIN — ACETAMINOPHEN 975 MILLIGRAM(S): 80 SOLUTION/ DROPS ORAL at 14:04

## 2024-12-22 RX ADMIN — Medication 0.25 MILLIGRAM(S): at 21:12

## 2024-12-22 RX ADMIN — LOSARTAN POTASSIUM 50 MILLIGRAM(S): 100 TABLET, FILM COATED ORAL at 19:32

## 2024-12-22 RX ADMIN — LOSARTAN POTASSIUM 50 MILLIGRAM(S): 100 TABLET, FILM COATED ORAL at 00:02

## 2024-12-22 RX ADMIN — ATORVASTATIN CALCIUM 40 MILLIGRAM(S): 40 TABLET, FILM COATED ORAL at 00:02

## 2024-12-22 RX ADMIN — MIRTAZAPINE 7.5 MILLIGRAM(S): 7.5 TABLET, FILM COATED ORAL at 21:12

## 2024-12-22 RX ADMIN — ATORVASTATIN CALCIUM 40 MILLIGRAM(S): 40 TABLET, FILM COATED ORAL at 21:12

## 2024-12-22 RX ADMIN — ACETAMINOPHEN 975 MILLIGRAM(S): 80 SOLUTION/ DROPS ORAL at 21:12

## 2024-12-22 RX ADMIN — Medication 81 MILLIGRAM(S): at 13:04

## 2024-12-22 RX ADMIN — ACETAMINOPHEN 975 MILLIGRAM(S): 80 SOLUTION/ DROPS ORAL at 22:12

## 2024-12-22 RX ADMIN — Medication 81 MILLIGRAM(S): at 00:02

## 2024-12-22 RX ADMIN — ENOXAPARIN SODIUM 40 MILLIGRAM(S): 60 INJECTION INTRAVENOUS; SUBCUTANEOUS at 00:02

## 2024-12-22 RX ADMIN — ENOXAPARIN SODIUM 40 MILLIGRAM(S): 60 INJECTION INTRAVENOUS; SUBCUTANEOUS at 23:59

## 2024-12-22 RX ADMIN — ACETAMINOPHEN 975 MILLIGRAM(S): 80 SOLUTION/ DROPS ORAL at 06:19

## 2024-12-22 RX ADMIN — MIRTAZAPINE 7.5 MILLIGRAM(S): 7.5 TABLET, FILM COATED ORAL at 02:12

## 2024-12-22 RX ADMIN — Medication 5 MILLIGRAM(S): at 06:19

## 2024-12-22 NOTE — PATIENT PROFILE ADULT - FALL HARM RISK - RISK INTERVENTIONS

## 2024-12-22 NOTE — PATIENT PROFILE ADULT - IS THERE A SUSPICION OF ABUSE/NEGLIGENCE?
[Normal Mood and Affect] : normal mood and affect [Able to Communicate] : able to communicate [Well Developed] : well developed [Well Nourished] : well nourished [NL (30)] : right lateral bending 30 degrees [Flexion] : flexion [Extension] : extension [Bending to right] : bending to right [Disc space narrowing] : Disc space narrowing [] : non-antalgic [FreeTextEntry9] : Hips rotate well without pain.  [FreeTextEntry1] : Multilevel DDD, worse at L1-2 and L2-3 with severe  narrowing and bone spurs. [TWNoteComboBox7] : forward flexion 75 degrees no

## 2024-12-22 NOTE — PROVIDER CONTACT NOTE (OTHER) - ASSESSMENT
Pt A&Ox4, VSS< no c/o pain, chest pain, SOB or dizziness. Pt arrived on 7T from ED. B/L upper extremity rash noted w/ raised red bumps. Pt stated "those are scratches from my sons cat." Pt was notified that they were bumps not scratches. Pt states "they could be hives from being nervous about everything going on."

## 2024-12-22 NOTE — PATIENT PROFILE ADULT - DO YOU FEEL THREATENED BY OTHERS?
Encounter created to establish relationship between patient and provider for the purposes of messaging and scheduling through the [employer] Patient Portal.    Flora OchoaSelect Specialty Hospital - GreensboroFabrice Romero  
no

## 2024-12-22 NOTE — PROGRESS NOTE ADULT - SUBJECTIVE AND OBJECTIVE BOX
Patient resting without complaints, anxious to know what the proceeding plan of care will be during hospital admission. No acute overnight events since arrival to the floor. No chest pain, SOB, N/V/D/HA.     T(C): 36.8 (12-22-24 @ 08:16), Max: 37.3 (12-22-24 @ 05:19)  HR: 65 (12-22-24 @ 08:16) (65 - 88)  BP: 133/70 (12-22-24 @ 08:16) (127/76 - 171/76)  RR: 18 (12-22-24 @ 08:16) (15 - 18)  SpO2: 96% (12-22-24 @ 08:16) (95% - 100%)  Wt(kg): --    Exam:  Alert and Oriented, No Acute Distress  Cardiac: Normal S1 & S2, RRR, No murmurs, rubs or gallops appreciated.  Pulmonary: normal breathing effort, no retractions, no diminished lung sounds appreciated. Bronchial/Vesicular lungs sounds appreciated throughout all lung lobes.  Lower Extremities:   R Lower Extremity:  Skin intact   SILT S/S/DP/SP/T  +EHL/FHL/TA/GSC  Compartments soft/non-tender  Toes warm, Cap refill brisk/warm and perfused, +DP/PT pulse     Xray:   Labs:                        14.5   11.29 )-----------( 319      ( 21 Dec 2024 19:45 )             42.5    12-21    139  |  105  |  11  ----------------------------<  124[H]  4.2   |  20[L]  |  0.89    Ca    9.4      21 Dec 2024 19:45  Phos  3.2     12-21  Mg     2.1     12-21    TPro  7.4  /  Alb  4.4  /  TBili  0.5  /  DBili  x   /  AST  23  /  ALT  23  /  AlkPhos  114  12-21      A/p: 64yMale s/p L/R Total Hip/Knee Arthroplasty, PODX. VSS. NAD.    PT/OT- WBAT RLE/LLE   IS  DVT PPx: Aspirin XX mg BID, SCD   GI PPx: Protonix 40 mg QD   Pain Control  Continue Current Tx.  Follow up AM labs   Dispo planning: recommended for __, pending further eval.     Mekhi Dubose PA-C  Orthopedic Surgery   Team Pager: #[phone]

## 2024-12-23 ENCOUNTER — TRANSCRIPTION ENCOUNTER (OUTPATIENT)
Age: 64
End: 2024-12-23

## 2024-12-23 VITALS
SYSTOLIC BLOOD PRESSURE: 175 MMHG | HEART RATE: 70 BPM | RESPIRATION RATE: 18 BRPM | TEMPERATURE: 98 F | OXYGEN SATURATION: 97 % | DIASTOLIC BLOOD PRESSURE: 93 MMHG

## 2024-12-23 LAB — ALP SERPL-CCNC: 108 U/L — SIGNIFICANT CHANGE UP (ref 40–120)

## 2024-12-23 PROCEDURE — 84100 ASSAY OF PHOSPHORUS: CPT

## 2024-12-23 PROCEDURE — 73700 CT LOWER EXTREMITY W/O DYE: CPT | Mod: MC

## 2024-12-23 PROCEDURE — 73723 MRI JOINT LWR EXTR W/O&W/DYE: CPT | Mod: MC

## 2024-12-23 PROCEDURE — 84165 PROTEIN E-PHORESIS SERUM: CPT

## 2024-12-23 PROCEDURE — 72192 CT PELVIS W/O DYE: CPT | Mod: MC

## 2024-12-23 PROCEDURE — 86335 IMMUNFIX E-PHORSIS/URINE/CSF: CPT

## 2024-12-23 PROCEDURE — 99285 EMERGENCY DEPT VISIT HI MDM: CPT

## 2024-12-23 PROCEDURE — 84156 ASSAY OF PROTEIN URINE: CPT

## 2024-12-23 PROCEDURE — 82378 CARCINOEMBRYONIC ANTIGEN: CPT

## 2024-12-23 PROCEDURE — 71046 X-RAY EXAM CHEST 2 VIEWS: CPT

## 2024-12-23 PROCEDURE — 76377 3D RENDER W/INTRP POSTPROCES: CPT

## 2024-12-23 PROCEDURE — 85652 RBC SED RATE AUTOMATED: CPT

## 2024-12-23 PROCEDURE — 80053 COMPREHEN METABOLIC PANEL: CPT

## 2024-12-23 PROCEDURE — 73502 X-RAY EXAM HIP UNI 2-3 VIEWS: CPT

## 2024-12-23 PROCEDURE — 84075 ASSAY ALKALINE PHOSPHATASE: CPT

## 2024-12-23 PROCEDURE — 84443 ASSAY THYROID STIM HORMONE: CPT

## 2024-12-23 PROCEDURE — 36415 COLL VENOUS BLD VENIPUNCTURE: CPT

## 2024-12-23 PROCEDURE — 84166 PROTEIN E-PHORESIS/URINE/CSF: CPT

## 2024-12-23 PROCEDURE — 85025 COMPLETE CBC W/AUTO DIFF WBC: CPT

## 2024-12-23 PROCEDURE — 82330 ASSAY OF CALCIUM: CPT

## 2024-12-23 PROCEDURE — 83735 ASSAY OF MAGNESIUM: CPT

## 2024-12-23 PROCEDURE — 84155 ASSAY OF PROTEIN SERUM: CPT

## 2024-12-23 PROCEDURE — 72170 X-RAY EXAM OF PELVIS: CPT

## 2024-12-23 PROCEDURE — 73552 X-RAY EXAM OF FEMUR 2/>: CPT

## 2024-12-23 PROCEDURE — 97162 PT EVAL MOD COMPLEX 30 MIN: CPT

## 2024-12-23 PROCEDURE — G0103: CPT

## 2024-12-23 PROCEDURE — 71250 CT THORAX DX C-: CPT | Mod: MC

## 2024-12-23 PROCEDURE — A9585: CPT

## 2024-12-23 PROCEDURE — 86334 IMMUNOFIX E-PHORESIS SERUM: CPT

## 2024-12-23 PROCEDURE — 86140 C-REACTIVE PROTEIN: CPT

## 2024-12-23 PROCEDURE — 83615 LACTATE (LD) (LDH) ENZYME: CPT

## 2024-12-23 RX ORDER — POLYETHYLENE GLYCOL 3350 17 G/DOSE
17 POWDER (GRAM) ORAL
Qty: 0 | Refills: 0 | DISCHARGE
Start: 2024-12-23

## 2024-12-23 RX ORDER — SENNOSIDES 8.6 MG/1
2 TABLET, FILM COATED ORAL
Qty: 14 | Refills: 0
Start: 2024-12-23 | End: 2024-12-29

## 2024-12-23 RX ORDER — POLYETHYLENE GLYCOL 3350 17 G/DOSE
17 POWDER (GRAM) ORAL AT BEDTIME
Refills: 0 | Status: DISCONTINUED | OUTPATIENT
Start: 2024-12-23 | End: 2024-12-23

## 2024-12-23 RX ORDER — PANTOPRAZOLE 40 MG/1
1 TABLET, DELAYED RELEASE ORAL
Qty: 7 | Refills: 0
Start: 2024-12-23 | End: 2024-12-29

## 2024-12-23 RX ORDER — ACETAMINOPHEN 80 MG/.8ML
3 SOLUTION/ DROPS ORAL
Qty: 63 | Refills: 0
Start: 2024-12-23 | End: 2024-12-29

## 2024-12-23 RX ORDER — PANTOPRAZOLE 40 MG/1
40 TABLET, DELAYED RELEASE ORAL
Refills: 0 | Status: DISCONTINUED | OUTPATIENT
Start: 2024-12-23 | End: 2024-12-23

## 2024-12-23 RX ADMIN — ACETAMINOPHEN 975 MILLIGRAM(S): 80 SOLUTION/ DROPS ORAL at 13:52

## 2024-12-23 RX ADMIN — LOSARTAN POTASSIUM 50 MILLIGRAM(S): 100 TABLET, FILM COATED ORAL at 05:21

## 2024-12-23 RX ADMIN — ACETAMINOPHEN 975 MILLIGRAM(S): 80 SOLUTION/ DROPS ORAL at 14:22

## 2024-12-23 RX ADMIN — PANTOPRAZOLE 40 MILLIGRAM(S): 40 TABLET, DELAYED RELEASE ORAL at 07:42

## 2024-12-23 RX ADMIN — ACETAMINOPHEN 975 MILLIGRAM(S): 80 SOLUTION/ DROPS ORAL at 05:22

## 2024-12-23 RX ADMIN — Medication 81 MILLIGRAM(S): at 11:51

## 2024-12-23 RX ADMIN — Medication 5 MILLIGRAM(S): at 05:21

## 2024-12-23 NOTE — PROGRESS NOTE ADULT - ASSESSMENT
64y Male with pathologic fracture of R proximal femur. XR and CT demonstrate lytic lesion with bubbly appearance. DDx includes fibrous dysplasia, LSMFT, simple bone cyst, mets, lymphoma, etc. VSS. NAD.     PLAN  - NWB RLE  - Pain control  - Follow-up labs ordered: CBC+diff, CMP, Mg/Phos, LDH, ALP, Ca+ionized Ca, ESR/CRP, TSH, CEA, PSA, serum protein eletrophoresis, urine protein electrophoresis  - MRI R hip w/w/o IV contrast  - Results of MR will determine plan for surgical intervention    Discussed with attending orthopaedic oncologist, Dr. Fonseca.     Mekhi Dubose PA-C  Orthopedic Surgery  Pager #[phone]  
A/P: 63 y/o male with pathologic fracture of R proximal femur. XR and CT demonstrate lytic lesion with bubbly appearance. DDx includes fibrous dysplasia, LSMFT, simple bone cyst, mets, lymphoma, etc.    - Pain control/analgesia  - DVT ppx: Lovenox, ASA, SCDs  - NWB RLE  - OOB  - Incentive spirometer  - GI ppx  - Bowel regimen  - Follow up MRI R hip w/w/o IV contrast  - Results of MR will determine plan for surgical intervention  - Notify ortho for questions    Catalina Reed PA-C  Orthopedic Surgery Team  Team Pager #[phone]

## 2024-12-23 NOTE — PHYSICAL THERAPY INITIAL EVALUATION ADULT - ADDITIONAL COMMENTS
Pt lives in a private home alone, there are 2 steps to enter, first floor set up. Pt performed ADL/IADLs independently. Ambulates with no assistive device. Owns DME: crutches

## 2024-12-23 NOTE — DISCHARGE NOTE PROVIDER - NSDCFUSCHEDAPPT_GEN_ALL_CORE_FT
Cristian Shirley  [facility] Physician [facility]  CARDIOLOGY [address]   Scheduled Appointment: 01/08/2025

## 2024-12-23 NOTE — DISCHARGE NOTE NURSING/CASE MANAGEMENT/SOCIAL WORK - FINANCIAL ASSISTANCE
[facility] provides services at a reduced cost to those who are determined to be eligible through [facility]’s financial assistance program. Information regarding [facility]’s financial assistance program can be found by going to [email or web address] or by calling [phone].

## 2024-12-23 NOTE — DISCHARGE NOTE PROVIDER - NSDCFUADDINST_GEN_ALL_CORE_FT
XXXXX - Follow up in office with Dr. Fonseca in 2 weeks. Please call office for appointment.  - Follow up UPEP results in office with Dr. Fonseca.  - Continue weight bearing as tolerated right lower extremity with abduction precautions (no active hip abduction) and crutches for comfort as per Dr. Fonseca.  - Continue medications as per med rec.  - Follow up with your primary care physician regarding your hospitalization and changes to medications within one month of your discharge.

## 2024-12-23 NOTE — DISCHARGE NOTE PROVIDER - NSDCFUADDAPPT_GEN_ALL_CORE_FT
Follow up with your primary care physician regarding your hospitalization and changes to medications within one month of your discharge. - Follow up with your primary care physician regarding your hospitalization and changes to medications within one month of your discharge.

## 2024-12-23 NOTE — DISCHARGE NOTE PROVIDER - HOSPITAL COURSE
History of Present Illness:  Patient is a 64y old  Male PMH HTN and HLD who presents with a chief complaint of R hip pain. Yesterday he tripped and fell over a cat toy directly onto his R hip/side. He denies HS/LOC. He denies previous pain in the hip prior to the injury. He has no cancer history. Denies recent infection, fevers or chills.     Past Medical & Surgical History:  Lactose intolerance  Gluten intolerance  IBS (irritable bowel syndrome)  Anxiety  Hypertension, March 2022  Insomnia, secondary to anxiety  Diverticulosis  Hiatal hernia, no GERD  Hyperlipidemia  Elevated coronary artery calcium score, 485 April 2022, follow up stress test "normal"  Umbilical hernia  Bacterial gastroenteritis, 2007  COVID-19 vaccine series completed  COVID-19, March 2020, flu-like symptoms. Never was tested but was directly exposed to covid  Osteoarthritis  Dislocation of right thumb, chronic  Fatty liver  Tinnitus, left ear  Hearing loss, left  White coat syndrome with hypertension  History of thumb surgery, right thumb 1985 removal of bone fragments and cartilege repair and 2015 reattachement of right thumb after traumatic amutation  Status post LASIK surgery of both eyes, 2002    Hospital Course:  Patient was admitted to [facility] on 12/21/24 right proximal femur pathologic fx.    MRI and XRs revealed XXXXX.    Patient was placed on Lovenox for DVT ppx, and was placed on Protonix for GI protection.    Patient was made weight bearing as tolerated right lower extremity.    Patient evaluated by PT/OT and recommended for disposition for XXXXX.    Discharge and orthopedic care instructions were delineated in the discharge plan and reviewed with the patient. All medications were delineated in the medication reconciliation tool and key points were reviewed with the patient. They were deemed stable from an orthopedic & medical standpoint for discharge. History of Present Illness:  Patient is a 64y old  Male PMH HTN and HLD who presents with a chief complaint of R hip pain. Yesterday he tripped and fell over a cat toy directly onto his R hip/side. He denies HS/LOC. He denies previous pain in the hip prior to the injury. He has no cancer history. Denies recent infection, fevers or chills.     Past Medical & Surgical History:  Lactose intolerance  Gluten intolerance  IBS (irritable bowel syndrome)  Anxiety  Hypertension, March 2022  Insomnia, secondary to anxiety  Diverticulosis  Hiatal hernia, no GERD  Hyperlipidemia  Elevated coronary artery calcium score, 485 April 2022, follow up stress test "normal"  Umbilical hernia  Bacterial gastroenteritis, 2007  COVID-19 vaccine series completed  COVID-19, March 2020, flu-like symptoms. Never was tested but was directly exposed to covid  Osteoarthritis  Dislocation of right thumb, chronic  Fatty liver  Tinnitus, left ear  Hearing loss, left  White coat syndrome with hypertension  History of thumb surgery, right thumb 1985 removal of bone fragments and cartilege repair and 2015 reattachement of right thumb after traumatic amutation  Status post LASIK surgery of both eyes, 2002    Hospital Course:  Patient was admitted to [facility] on 12/21/24 for right proximal femur pathologic fx.    Patient was placed on Lovenox for DVT ppx, and was placed on Protonix for GI protection.    Patient was made weight bearing as tolerated right lower extremity with abduction precautions (no active hip abduction) and crutches for comfort as per Dr. Fonseca.    Patient evaluated by PT/OT and recommended for disposition for home with no skilled PT needs.    Discharge and orthopedic care instructions were delineated in the discharge plan and reviewed with the patient. All medications were delineated in the medication reconciliation tool and key points were reviewed with the patient. They were deemed stable from an orthopedic & medical standpoint for discharge.

## 2024-12-23 NOTE — DISCHARGE NOTE PROVIDER - NSDCCPCAREPLAN_GEN_ALL_CORE_FT
PRINCIPAL DISCHARGE DIAGNOSIS  Diagnosis: Pathological fracture of right femur  Assessment and Plan of Treatment:

## 2024-12-23 NOTE — DISCHARGE NOTE NURSING/CASE MANAGEMENT/SOCIAL WORK - PATIENT PORTAL LINK FT
You can access the FollowMyHealth Patient Portal offered by [facility] by registering at the following website: [email or web address]. By joining [employer]’s FollowMyHealth portal, you will also be able to view your health information using other applications (apps) compatible with our system.

## 2024-12-23 NOTE — DISCHARGE NOTE PROVIDER - CARE PROVIDER_API CALL
Liliam Fonseca  Musculoskeletal Oncology  [address] [place] [address]  Phone: [phone]  Fax: [phone]  Follow Up Time: 2 weeks

## 2024-12-23 NOTE — PHYSICAL THERAPY INITIAL EVALUATION ADULT - PERTINENT HX OF CURRENT PROBLEM, REHAB EVAL
63 y/o male with pathologic fracture of R proximal femur. XR and CT demonstrate lytic lesion with bubbly appearance. DDx includes fibrous dysplasia, LSMFT, simple bone cyst, mets, lymphoma, etc. CT Chest No acute fracture or evidence of traumatic visceral injury. XR R femur/hip/pelvis Acute minimally displaced oblique longitudinal pathologic intertrochanteric right proximal femoral fracture, occurring at site of nonspecific but benign appearing large expansile osteolytic lesion.  CXR No acute displaced fractures are identified. CT Pelvis/femur Acute minimally displaced fracture involving the right greater   trochanter. 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 Slightly increased attenuation at the intramedullary space of the right intertrochanteric region, which may be due to edema and/or fracture extension.

## 2024-12-23 NOTE — DISCHARGE NOTE PROVIDER - NSDCMRMEDTOKEN_GEN_ALL_CORE_FT
amLODIPine 5 mg oral tablet: 1 tab(s) orally once a day  aspirin 81 mg oral tablet: 1 tab(s) orally once a day (at bedtime)  atorvastatin 40 mg oral tablet: 1 tab(s) orally once a day (at bedtime)  hyoscyamine 0.375 mg oral tablet, extended release: 1 tab(s) orally every 12 hours  ibuprofen 600 mg oral tablet: 1 tab(s) orally every 6 hours, As Needed -for moderate pain   losartan 50 mg oral tablet: 1 tab(s) orally 2 times a day  mirtazapine 7.5 mg oral tablet: 1 tab(s) orally once a day (at bedtime)  oxycodone-acetaminophen 5 mg-325 mg oral tablet: 1 tab(s) orally every 6 hours, As Needed -for severe pain MDD:5   acetaminophen 325 mg oral tablet: 3 tab(s) orally every 8 hours as needed for  mild pain  amLODIPine 5 mg oral tablet: 1 tab(s) orally once a day  aspirin 81 mg oral tablet: 1 tab(s) orally once a day (at bedtime)  atorvastatin 40 mg oral tablet: 1 tab(s) orally once a day (at bedtime)  hyoscyamine 0.375 mg oral tablet, extended release: 1 tab(s) orally every 12 hours  losartan 50 mg oral tablet: 1 tab(s) orally 2 times a day  mirtazapine 7.5 mg oral tablet: 1 tab(s) orally once a day (at bedtime)  pantoprazole 40 mg oral delayed release tablet: 1 tab(s) orally once a day (before a meal)  polyethylene glycol 3350 oral powder for reconstitution: 17 gram(s) orally once a day (at bedtime)  senna leaf extract oral tablet: 2 tab(s) orally once a day (at bedtime)

## 2024-12-23 NOTE — DISCHARGE NOTE NURSING/CASE MANAGEMENT/SOCIAL WORK - NSDCFUADDAPPT_GEN_ALL_CORE_FT
- Follow up with your primary care physician regarding your hospitalization and changes to medications within one month of your discharge.

## 2024-12-23 NOTE — PROGRESS NOTE ADULT - SUBJECTIVE AND OBJECTIVE BOX
Orthopedic Progress Note    Patient seen and examined at bedside.  Patient denies CP, SOB, dizziness, HA, N/V/D.  Patient reports pain controlled.    Vital Signs Last 24 Hrs  T(C): 36.6 (23 Dec 2024 04:18), Max: 36.8 (22 Dec 2024 08:16)  T(F): 97.9 (23 Dec 2024 04:18), Max: 98.3 (22 Dec 2024 08:16)  HR: 60 (23 Dec 2024 04:18) (60 - 74)  BP: 148/85 (23 Dec 2024 04:18) (133/70 - 156/88)  BP(mean): --  RR: 18 (23 Dec 2024 04:18) (18 - 18)  SpO2: 97% (23 Dec 2024 04:18) (96% - 97%)    Parameters below as of 23 Dec 2024 04:18  Patient On (Oxygen Delivery Method): room air    Exam:  Gen: No acute distress  RLE: Skin intact  Motor intact EHL/FHL/TA/GS  SILT in the distal extremity  + DP pulse  BLE: Calves soft and nontender

## 2024-12-24 LAB
CREATININE, URINE RESULT: 135 MG/DL — SIGNIFICANT CHANGE UP
PROT ?TM UR-MCNC: 10 MG/DL — SIGNIFICANT CHANGE UP (ref 0–12)

## 2024-12-26 LAB
% GAMMA, URINE: 10.3 % — SIGNIFICANT CHANGE UP
ALBUMIN 24H MFR UR ELPH: 12.6 % — SIGNIFICANT CHANGE UP
ALPHA1 GLOB 24H MFR UR ELPH: 42.8 % — SIGNIFICANT CHANGE UP
ALPHA2 GLOB 24H MFR UR ELPH: 17.1 % — SIGNIFICANT CHANGE UP
B-GLOBULIN 24H MFR UR ELPH: 17.2 % — SIGNIFICANT CHANGE UP
COLLECT DURATION TIME UR: 24 HR — SIGNIFICANT CHANGE UP
INTERPRETATION 24H UR IFE-IMP: SIGNIFICANT CHANGE UP
M PROTEIN 24H UR ELPH-MRATE: 0 % — SIGNIFICANT CHANGE UP
M PROTEIN 24H UR ELPH-MRATE: 0 MG/24HR — SIGNIFICANT CHANGE UP (ref 0–0)
M PROTEIN 24H UR ELPH-MRATE: 0 MG/DL — SIGNIFICANT CHANGE UP
PROT ?TM UR-MCNC: 10 MG/DL — SIGNIFICANT CHANGE UP (ref 0–12)
PROT PATTERN 24H UR ELPH-IMP: SIGNIFICANT CHANGE UP
PROTEIN QUANT CALC, URINE: 160 MG/24 H — HIGH (ref 50–100)
TOTAL VOLUME - 24 HOUR: 1600 ML — SIGNIFICANT CHANGE UP
URINE CREATININE CALCULATION: 2.2 G/24 H — HIGH (ref 1–2)

## 2024-12-28 LAB
% ALBUMIN: 59.1 % — SIGNIFICANT CHANGE UP
% ALPHA 1: 4.2 % — SIGNIFICANT CHANGE UP
% ALPHA 2: 11.9 % — SIGNIFICANT CHANGE UP
% BETA: 11.7 % — SIGNIFICANT CHANGE UP
% GAMMA: 13.1 % — SIGNIFICANT CHANGE UP
ALBUMIN SERPL ELPH-MCNC: 4.4 G/DL — SIGNIFICANT CHANGE UP (ref 3.6–5.5)
ALBUMIN/GLOB SERPL ELPH: 1.5 RATIO — SIGNIFICANT CHANGE UP
ALPHA1 GLOB SERPL ELPH-MCNC: 0.3 G/DL — SIGNIFICANT CHANGE UP (ref 0.1–0.4)
ALPHA2 GLOB SERPL ELPH-MCNC: 0.9 G/DL — SIGNIFICANT CHANGE UP (ref 0.5–1)
B-GLOBULIN SERPL ELPH-MCNC: 0.9 G/DL — SIGNIFICANT CHANGE UP (ref 0.5–1)
GAMMA GLOBULIN: 1 G/DL — SIGNIFICANT CHANGE UP (ref 0.6–1.6)
INTERPRETATION SERPL IFE-IMP: SIGNIFICANT CHANGE UP
PROT PATTERN SERPL ELPH-IMP: SIGNIFICANT CHANGE UP
PROT SERPL-MCNC: 7.4 G/DL — SIGNIFICANT CHANGE UP (ref 6–8.3)

## 2025-01-07 ENCOUNTER — NON-APPOINTMENT (OUTPATIENT)
Age: 65
End: 2025-01-07

## 2025-01-08 ENCOUNTER — APPOINTMENT (OUTPATIENT)
Dept: CARDIOLOGY | Facility: CLINIC | Age: 65
End: 2025-01-08
Payer: MEDICARE

## 2025-01-08 VITALS
HEIGHT: 71 IN | SYSTOLIC BLOOD PRESSURE: 140 MMHG | WEIGHT: 209 LBS | HEART RATE: 84 BPM | RESPIRATION RATE: 16 BRPM | TEMPERATURE: 97.9 F | OXYGEN SATURATION: 97 % | BODY MASS INDEX: 29.26 KG/M2 | DIASTOLIC BLOOD PRESSURE: 86 MMHG

## 2025-01-08 DIAGNOSIS — M85.60 OTHER CYST OF BONE, UNSPECIFIED SITE: ICD-10-CM

## 2025-01-08 DIAGNOSIS — I25.10 ATHEROSCLEROTIC HEART DISEASE OF NATIVE CORONARY ARTERY W/OUT ANGINA PECTORIS: ICD-10-CM

## 2025-01-08 DIAGNOSIS — Z13.228 ENCOUNTER FOR SCREENING FOR OTHER METABOLIC DISORDERS: ICD-10-CM

## 2025-01-08 DIAGNOSIS — R79.89 OTHER SPECIFIED ABNORMAL FINDINGS OF BLOOD CHEMISTRY: ICD-10-CM

## 2025-01-08 DIAGNOSIS — F41.9 ANXIETY DISORDER, UNSPECIFIED: ICD-10-CM

## 2025-01-08 DIAGNOSIS — R73.03 PREDIABETES.: ICD-10-CM

## 2025-01-08 DIAGNOSIS — R10.9 UNSPECIFIED ABDOMINAL PAIN: ICD-10-CM

## 2025-01-08 DIAGNOSIS — F32.A ANXIETY DISORDER, UNSPECIFIED: ICD-10-CM

## 2025-01-08 DIAGNOSIS — E55.9 VITAMIN D DEFICIENCY, UNSPECIFIED: ICD-10-CM

## 2025-01-08 DIAGNOSIS — D22.9 MELANOCYTIC NEVI, UNSPECIFIED: ICD-10-CM

## 2025-01-08 DIAGNOSIS — E78.5 HYPERLIPIDEMIA, UNSPECIFIED: ICD-10-CM

## 2025-01-08 DIAGNOSIS — I10 ESSENTIAL (PRIMARY) HYPERTENSION: ICD-10-CM

## 2025-01-08 DIAGNOSIS — Z91.81 HISTORY OF FALLING: ICD-10-CM

## 2025-01-08 PROCEDURE — 99214 OFFICE O/P EST MOD 30 MIN: CPT

## 2025-01-08 PROCEDURE — 93000 ELECTROCARDIOGRAM COMPLETE: CPT

## 2025-01-08 PROCEDURE — G2211 COMPLEX E/M VISIT ADD ON: CPT

## 2025-01-09 ENCOUNTER — NON-APPOINTMENT (OUTPATIENT)
Age: 65
End: 2025-01-09

## 2025-01-09 LAB
25(OH)D3 SERPL-MCNC: 29.4 NG/ML
ALBUMIN SERPL ELPH-MCNC: 4.6 G/DL
ALP BLD-CCNC: 159 U/L
ALT SERPL-CCNC: 29 U/L
ANION GAP SERPL CALC-SCNC: 16 MMOL/L
APPEARANCE: CLEAR
AST SERPL-CCNC: 19 U/L
BACTERIA: NEGATIVE /HPF
BASOPHILS # BLD AUTO: 0.06 K/UL
BASOPHILS NFR BLD AUTO: 0.6 %
BILIRUB DIRECT SERPL-MCNC: 0.1 MG/DL
BILIRUB INDIRECT SERPL-MCNC: 0.3 MG/DL
BILIRUB SERPL-MCNC: 0.4 MG/DL
BILIRUBIN URINE: NEGATIVE
BLOOD URINE: NEGATIVE
BUN SERPL-MCNC: 12 MG/DL
CALCIUM SERPL-MCNC: 9.9 MG/DL
CAST: 0 /LPF
CHLORIDE SERPL-SCNC: 99 MMOL/L
CHOLEST SERPL-MCNC: 146 MG/DL
CK SERPL-CCNC: 53 U/L
CO2 SERPL-SCNC: 23 MMOL/L
COLOR: YELLOW
CREAT SERPL-MCNC: 0.86 MG/DL
CREAT SPEC-SCNC: 88 MG/DL
EGFR: 96 ML/MIN/1.73M2
EOSINOPHIL # BLD AUTO: 0.08 K/UL
EOSINOPHIL NFR BLD AUTO: 0.8 %
EPITHELIAL CELLS: 0 /HPF
ESTIMATED AVERAGE GLUCOSE: 148 MG/DL
GLUCOSE QUALITATIVE U: NEGATIVE MG/DL
GLUCOSE SERPL-MCNC: 133 MG/DL
HBA1C MFR BLD HPLC: 6.8 %
HCT VFR BLD CALC: 43.5 %
HDLC SERPL-MCNC: 64 MG/DL
HGB BLD-MCNC: 14.5 G/DL
IMM GRANULOCYTES NFR BLD AUTO: 0.4 %
KETONES URINE: NEGATIVE MG/DL
LDLC SERPL CALC-MCNC: 68 MG/DL
LEUKOCYTE ESTERASE URINE: NEGATIVE
LYMPHOCYTES # BLD AUTO: 1.68 K/UL
LYMPHOCYTES NFR BLD AUTO: 16 %
MAGNESIUM SERPL-MCNC: 2 MG/DL
MAN DIFF?: NORMAL
MCHC RBC-ENTMCNC: 30.2 PG
MCHC RBC-ENTMCNC: 33.3 G/DL
MCV RBC AUTO: 90.6 FL
MICROALBUMIN 24H UR DL<=1MG/L-MCNC: <1.2 MG/DL
MICROALBUMIN/CREAT 24H UR-RTO: NORMAL MG/G
MICROSCOPIC-UA: NORMAL
MONOCYTES # BLD AUTO: 0.38 K/UL
MONOCYTES NFR BLD AUTO: 3.6 %
NEUTROPHILS # BLD AUTO: 8.28 K/UL
NEUTROPHILS NFR BLD AUTO: 78.6 %
NITRITE URINE: NEGATIVE
NONHDLC SERPL-MCNC: 83 MG/DL
PH URINE: 7
PLATELET # BLD AUTO: 392 K/UL
POTASSIUM SERPL-SCNC: 4.4 MMOL/L
PROT SERPL-MCNC: 7.5 G/DL
PROTEIN URINE: NEGATIVE MG/DL
RBC # BLD: 4.8 M/UL
RBC # FLD: 12.6 %
RED BLOOD CELLS URINE: 0 /HPF
SODIUM SERPL-SCNC: 139 MMOL/L
SPECIFIC GRAVITY URINE: 1.01
T4 FREE SERPL-MCNC: 1.1 NG/DL
TRIGL SERPL-MCNC: 75 MG/DL
TSH SERPL-ACNC: 1.12 UIU/ML
UROBILINOGEN URINE: 0.2 MG/DL
WBC # FLD AUTO: 10.52 K/UL
WHITE BLOOD CELLS URINE: 0 /HPF

## 2025-01-12 PROBLEM — M89.9 LESION OF RIGHT FEMUR: Status: ACTIVE | Noted: 2025-01-12

## 2025-01-13 ENCOUNTER — APPOINTMENT (OUTPATIENT)
Dept: ORTHOPEDIC SURGERY | Facility: CLINIC | Age: 65
End: 2025-01-13
Payer: MEDICARE

## 2025-01-13 VITALS — HEIGHT: 71 IN | BODY MASS INDEX: 29.18 KG/M2 | WEIGHT: 208.4 LBS

## 2025-01-13 DIAGNOSIS — M89.9 DISORDER OF BONE, UNSPECIFIED: ICD-10-CM

## 2025-01-13 DIAGNOSIS — S72.114A NONDISPLACED FRACTURE OF GREATER TROCHANTER OF RIGHT FEMUR, INITIAL ENCOUNTER FOR CLOSED FRACTURE: ICD-10-CM

## 2025-01-13 PROCEDURE — 73502 X-RAY EXAM HIP UNI 2-3 VIEWS: CPT

## 2025-01-13 PROCEDURE — 99213 OFFICE O/P EST LOW 20 MIN: CPT

## 2025-01-20 PROBLEM — S72.114A CLOSED NONDISPLACED FRACTURE OF GREATER TROCHANTER OF RIGHT FEMUR, INITIAL ENCOUNTER: Status: ACTIVE | Noted: 2025-01-20

## 2025-07-02 ENCOUNTER — TRANSCRIPTION ENCOUNTER (OUTPATIENT)
Age: 65
End: 2025-07-02

## 2025-07-05 ENCOUNTER — RX RENEWAL (OUTPATIENT)
Age: 65
End: 2025-07-05

## 2025-07-07 ENCOUNTER — NON-APPOINTMENT (OUTPATIENT)
Age: 65
End: 2025-07-07

## 2025-07-09 ENCOUNTER — APPOINTMENT (OUTPATIENT)
Dept: CARDIOLOGY | Facility: CLINIC | Age: 65
End: 2025-07-09
Payer: MEDICARE

## 2025-07-09 ENCOUNTER — LABORATORY RESULT (OUTPATIENT)
Age: 65
End: 2025-07-09

## 2025-07-09 VITALS
HEART RATE: 68 BPM | SYSTOLIC BLOOD PRESSURE: 110 MMHG | WEIGHT: 208 LBS | HEIGHT: 71 IN | DIASTOLIC BLOOD PRESSURE: 70 MMHG | OXYGEN SATURATION: 98 % | TEMPERATURE: 98.3 F | BODY MASS INDEX: 29.12 KG/M2

## 2025-07-09 PROBLEM — R01.1 MURMUR: Status: ACTIVE | Noted: 2025-07-09
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
## 2025-07-09 PROCEDURE — 93000 ELECTROCARDIOGRAM COMPLETE: CPT

## 2025-07-09 PROCEDURE — G2211 COMPLEX E/M VISIT ADD ON: CPT

## 2025-07-09 PROCEDURE — 99214 OFFICE O/P EST MOD 30 MIN: CPT

## 2025-07-13 LAB
25(OH)D3 SERPL-MCNC: 45.9 NG/ML
ALBUMIN SERPL ELPH-MCNC: 4.6 G/DL
ALP BLD-CCNC: 100 U/L
ALT SERPL-CCNC: 45 U/L
ANION GAP SERPL CALC-SCNC: 17 MMOL/L
APPEARANCE: CLEAR
AST SERPL-CCNC: 32 U/L
BACTERIA: NEGATIVE /HPF
BASOPHILS # BLD AUTO: 0.04 K/UL
BASOPHILS NFR BLD AUTO: 0.5 %
BILIRUB DIRECT SERPL-MCNC: 0.21 MG/DL
BILIRUB INDIRECT SERPL-MCNC: 0.4 MG/DL
BILIRUB SERPL-MCNC: 0.6 MG/DL
BILIRUBIN URINE: NEGATIVE
BLOOD URINE: NEGATIVE
BUN SERPL-MCNC: 14 MG/DL
CALCIUM SERPL-MCNC: 9.6 MG/DL
CAST: 0 /LPF
CHLORIDE SERPL-SCNC: 104 MMOL/L
CHOLEST SERPL-MCNC: 142 MG/DL
CK SERPL-CCNC: 102 U/L
CO2 SERPL-SCNC: 20 MMOL/L
COLOR: YELLOW
CREAT SERPL-MCNC: 0.93 MG/DL
EGFRCR SERPLBLD CKD-EPI 2021: 90 ML/MIN/1.73M2
EOSINOPHIL # BLD AUTO: 0.14 K/UL
EOSINOPHIL NFR BLD AUTO: 1.8 %
EPITHELIAL CELLS: 1 /HPF
ESTIMATED AVERAGE GLUCOSE: 140 MG/DL
GLUCOSE QUALITATIVE U: NEGATIVE MG/DL
GLUCOSE SERPL-MCNC: 120 MG/DL
HBA1C MFR BLD HPLC: 6.5 %
HCT VFR BLD CALC: 43.8 %
HDLC SERPL-MCNC: 72 MG/DL
HGB BLD-MCNC: 14.3 G/DL
IMM GRANULOCYTES NFR BLD AUTO: 0.1 %
KETONES URINE: NEGATIVE MG/DL
LDLC SERPL-MCNC: 53 MG/DL
LEUKOCYTE ESTERASE URINE: ABNORMAL
LYMPHOCYTES # BLD AUTO: 2.53 K/UL
LYMPHOCYTES NFR BLD AUTO: 32.4 %
MAGNESIUM SERPL-MCNC: 2.2 MG/DL
MAN DIFF?: NORMAL
MCHC RBC-ENTMCNC: 30.4 PG
MCHC RBC-ENTMCNC: 32.6 G/DL
MCV RBC AUTO: 93 FL
MICROSCOPIC-UA: NORMAL
MONOCYTES # BLD AUTO: 0.41 K/UL
MONOCYTES NFR BLD AUTO: 5.2 %
NEUTROPHILS # BLD AUTO: 4.68 K/UL
NEUTROPHILS NFR BLD AUTO: 60 %
NITRITE URINE: NEGATIVE
NONHDLC SERPL-MCNC: 70 MG/DL
PH URINE: 6
PLATELET # BLD AUTO: 282 K/UL
POTASSIUM SERPL-SCNC: 4.3 MMOL/L
PROT SERPL-MCNC: 6.9 G/DL
PROTEIN URINE: NORMAL MG/DL
PSA SERPL-MCNC: 0.52 NG/ML
RBC # BLD: 4.71 M/UL
RBC # FLD: 12.7 %
RED BLOOD CELLS URINE: 1 /HPF
SODIUM SERPL-SCNC: 141 MMOL/L
SPECIFIC GRAVITY URINE: 1.02
T4 FREE SERPL-MCNC: 0.9 NG/DL
TRIGL SERPL-MCNC: 91 MG/DL
TSH SERPL-ACNC: 1.16 UIU/ML
UROBILINOGEN URINE: 0.2 MG/DL
WBC # FLD AUTO: 7.81 K/UL
WHITE BLOOD CELLS URINE: 0 /HPF

## 2025-07-14 ENCOUNTER — APPOINTMENT (OUTPATIENT)
Dept: ORTHOPEDIC SURGERY | Facility: CLINIC | Age: 65
End: 2025-07-14
Payer: MEDICARE

## 2025-07-14 ENCOUNTER — APPOINTMENT (OUTPATIENT)
Dept: CARDIOLOGY | Facility: CLINIC | Age: 65
End: 2025-07-14
Payer: MEDICARE

## 2025-07-14 PROCEDURE — 93306 TTE W/DOPPLER COMPLETE: CPT

## 2025-07-14 PROCEDURE — 99213 OFFICE O/P EST LOW 20 MIN: CPT

## 2025-07-14 PROCEDURE — 73502 X-RAY EXAM HIP UNI 2-3 VIEWS: CPT

## 2025-07-16 RX ORDER — METFORMIN HYDROCHLORIDE 500 MG/1
500 TABLET, COATED ORAL
Qty: 90 | Refills: 1 | Status: ACTIVE | COMMUNITY
Start: 2025-07-16 | End: 1900-01-01

## 2025-07-17 ENCOUNTER — APPOINTMENT (OUTPATIENT)
Dept: CT IMAGING | Facility: CLINIC | Age: 65
End: 2025-07-17
Payer: MEDICARE

## 2025-07-17 ENCOUNTER — OUTPATIENT (OUTPATIENT)
Dept: OUTPATIENT SERVICES | Facility: HOSPITAL | Age: 65
LOS: 1 days | End: 2025-07-17
Payer: MEDICARE

## 2025-07-17 DIAGNOSIS — I25.10 ATHEROSCLEROTIC HEART DISEASE OF NATIVE CORONARY ARTERY WITHOUT ANGINA PECTORIS: ICD-10-CM

## 2025-07-17 DIAGNOSIS — Z98.890 OTHER SPECIFIED POSTPROCEDURAL STATES: Chronic | ICD-10-CM

## 2025-07-17 PROCEDURE — 75574 CT ANGIO HRT W/3D IMAGE: CPT

## 2025-07-17 PROCEDURE — 75574 CT ANGIO HRT W/3D IMAGE: CPT | Mod: 26

## 2025-07-18 RX ORDER — EZETIMIBE 10 MG/1
10 TABLET ORAL
Qty: 90 | Refills: 1 | Status: ACTIVE | COMMUNITY
Start: 2025-07-18 | End: 1900-01-01

## 2025-07-24 ENCOUNTER — OUTPATIENT (OUTPATIENT)
Dept: OUTPATIENT SERVICES | Facility: HOSPITAL | Age: 65
LOS: 1 days | End: 2025-07-24
Payer: MEDICARE

## 2025-07-24 ENCOUNTER — APPOINTMENT (OUTPATIENT)
Dept: CT IMAGING | Facility: CLINIC | Age: 65
End: 2025-07-24
Payer: MEDICARE

## 2025-07-24 DIAGNOSIS — Z98.890 OTHER SPECIFIED POSTPROCEDURAL STATES: Chronic | ICD-10-CM

## 2025-07-24 DIAGNOSIS — R91.8 OTHER NONSPECIFIC ABNORMAL FINDING OF LUNG FIELD: ICD-10-CM

## 2025-07-24 PROCEDURE — 71250 CT THORAX DX C-: CPT | Mod: 26

## 2025-07-24 PROCEDURE — 71250 CT THORAX DX C-: CPT

## 2025-07-30 ENCOUNTER — APPOINTMENT (OUTPATIENT)
Dept: GASTROENTEROLOGY | Facility: CLINIC | Age: 65
End: 2025-07-30
Payer: MEDICARE

## 2025-07-30 VITALS
HEART RATE: 78 BPM | HEIGHT: 71 IN | BODY MASS INDEX: 29.12 KG/M2 | RESPIRATION RATE: 16 BRPM | DIASTOLIC BLOOD PRESSURE: 60 MMHG | SYSTOLIC BLOOD PRESSURE: 120 MMHG | OXYGEN SATURATION: 98 % | WEIGHT: 208 LBS

## 2025-07-30 DIAGNOSIS — K58.9 IRRITABLE BOWEL SYNDROME, UNSPECIFIED: ICD-10-CM

## 2025-07-30 DIAGNOSIS — K76.0 FATTY (CHANGE OF) LIVER, NOT ELSEWHERE CLASSIFIED: ICD-10-CM

## 2025-07-30 DIAGNOSIS — Z86.0100 PERSONAL HISTORY OF COLON POLYPS, UNSPECIFIED: ICD-10-CM

## 2025-07-30 PROCEDURE — G2211 COMPLEX E/M VISIT ADD ON: CPT

## 2025-07-30 PROCEDURE — 99213 OFFICE O/P EST LOW 20 MIN: CPT

## (undated) DEVICE — YELLOW PIN COVER

## (undated) DEVICE — BLANKET WARMER LOWER ADULT

## (undated) DEVICE — GLV 7.5 ESTEEM BLUE

## (undated) DEVICE — DRSG WEBRIL 3"

## (undated) DEVICE — DRILL BIT MEDARTIS TWIST CANN 2.2X87MM

## (undated) DEVICE — DRAPE 3/4 SHEET 52X76"

## (undated) DEVICE — ELCTR BIPOLAR CORD 12FT

## (undated) DEVICE — DRAPE C ARM MINI

## (undated) DEVICE — DRSG STERISTRIPS 0.5X4"

## (undated) DEVICE — NDL HYPO REGULAR BEVEL 25G X 1.5"

## (undated) DEVICE — SOLIDIFIER CANN EXPRESS 3K

## (undated) DEVICE — DRAPE SURGICAL #1010

## (undated) DEVICE — CANISTER SUCTION LID GUARD 3000CC

## (undated) DEVICE — GLV 7 PROTEXIS

## (undated) DEVICE — WRAP COMPRESSION CALF MED

## (undated) DEVICE — DRAPE TOWEL BLUE 17" X 24"

## (undated) DEVICE — PREP BETADINE KIT

## (undated) DEVICE — CUFF TOURNIQUET 18" DUAL PORT W PLC

## (undated) DEVICE — TUBING SUCTION 20FT

## (undated) DEVICE — DRSG ACE BANDAGE 4"

## (undated) DEVICE — POSITIONER STRAP ARMBOARD VELCRO TS-30 12

## (undated) DEVICE — PACK UPPER EXTREMITY

## (undated) DEVICE — DRSG ESMARK 4"